# Patient Record
Sex: MALE | Race: WHITE | NOT HISPANIC OR LATINO | Employment: FULL TIME | URBAN - METROPOLITAN AREA
[De-identification: names, ages, dates, MRNs, and addresses within clinical notes are randomized per-mention and may not be internally consistent; named-entity substitution may affect disease eponyms.]

---

## 2017-06-15 ENCOUNTER — HOSPITAL ENCOUNTER (OUTPATIENT)
Dept: SLEEP MEDICINE | Facility: HOSPITAL | Age: 57
Discharge: HOME | End: 2017-06-15
Admitting: INTERNAL MEDICINE

## 2017-06-15 PROCEDURE — G0463 HOSPITAL OUTPT CLINIC VISIT: HCPCS

## 2017-06-15 PROCEDURE — 99214 OFFICE O/P EST MOD 30 MIN: CPT | Performed by: INTERNAL MEDICINE

## 2017-06-18 PROBLEM — Z99.89 OSA ON CPAP: Status: ACTIVE | Noted: 2017-06-18

## 2017-06-18 PROBLEM — G47.33 OSA ON CPAP: Status: ACTIVE | Noted: 2017-06-18

## 2017-06-18 NOTE — PROGRESS NOTES
[facility] Follow up Note    Reason for follow-up: JAI    Patient Care Team:  Marcos Silva MD as PCP - General (Emergency Medicine)  Minh Torres MD as Consulting Physician (Sleep Medicine)    Chief complaint:  JAI    History of present illness:  The patient is a 57 y.o. male who I last saw and 2014.  He has JAI treated with auto CPAP.  He works from 5 PM to 1 AM.  His normal sleep schedule is 3 AM to 10 AM.  He falls asleep within 5 minutes and he is sleepy upon arising.  He might take a nap once a week.  His [place] Sleepiness Scale is abnormal at 11.  He might awaken with a dry mouth.    Review of Systems:  Recorded on the Sleep Questionnaire.  Unremarkable except for frequent urination, nasal congestion and postnasal drip.    Past Medical History:  Hypertension and diabetes and 2 cardiac stents placed in 2002 2003.    Family History: Hypertension, heart disease, COPD, and his dad had pancreatic cancer.    Social History:  He is a [employer] .  He has never smoked cigarettes.  No alcohol and no caffeine.    Allergies:  None     Medication Review: His list was reviewed.  Metoprolol, triamterene, escitalopram, fish oil, vitamin B12, atorvastatin,Roperinole.    Vital Signs:  Height 69.5 inches and weight 248 and he is obese with a body mass index of 36.  In 2014 his weight was 258.  /64 and heart rate 60 and neck size 16.5 inches.     Physical Exam:  Recorded on the [facility] Physical Exam Form and is unremarkable except for:  A large tongue and normal uvula and narrow posterior pharyngeal opening and class II-III MP airway.     Results Review:  DME is [employer] and he uses a [employer].  Downloads between December 17, 2016-June 14, 2017 compliances 64% and average usage is 5 hours and 52 minutes and average AHI is normal with a leak of 57 minutes.  Average auto CPAP pressure is 14.5 and his auto CPAP is 13-20.       Impression:   Obstructive sleep apnea adequately  treated with auto titrating CPAP with suboptimal compliance and good usage.  He does have complaints of hypersomnolence however, he also has circadian rhythm sleep disorder, shiftwork type.    Plan:  Good sleep hygiene measures should be maintained weight loss would be beneficial.  The patient should uses auto CPAP every day.  If he takes his nap he should also uses auto CPAP.  I will see the patient back in 3-4 months to make sure that compliance has improved.    Thank you for allowing me to assist in this patient's care.    Minh Torres MD  06/18/17  12:46 PM

## 2018-04-09 ENCOUNTER — HOSPITAL ENCOUNTER (EMERGENCY)
Facility: HOSPITAL | Age: 58
Discharge: HOME | End: 2018-04-09
Attending: EMERGENCY MEDICINE | Admitting: EMERGENCY MEDICINE

## 2018-04-09 VITALS
TEMPERATURE: 97.7 F | SYSTOLIC BLOOD PRESSURE: 128 MMHG | BODY MASS INDEX: 36.07 KG/M2 | HEIGHT: 68 IN | RESPIRATION RATE: 16 BRPM | WEIGHT: 238 LBS | OXYGEN SATURATION: 96 % | HEART RATE: 56 BPM | DIASTOLIC BLOOD PRESSURE: 87 MMHG

## 2018-04-09 DIAGNOSIS — K59.00 CONSTIPATION, UNSPECIFIED CONSTIPATION TYPE: Primary | ICD-10-CM

## 2018-04-09 LAB
ALBUMIN SERPL-MCNC: 4.4 G/DL (ref 3.5–5.2)
ALBUMIN/GLOB SERPL: 1.5 G/DL
ALP SERPL-CCNC: 48 U/L (ref 39–117)
ALT SERPL W P-5'-P-CCNC: 24 U/L (ref 1–41)
ANION GAP SERPL CALCULATED.3IONS-SCNC: 12.7 MMOL/L
AST SERPL-CCNC: 23 U/L (ref 1–40)
BACTERIA UR QL AUTO: NORMAL /HPF
BASOPHILS # BLD AUTO: 0.02 10*3/MM3 (ref 0–0.2)
BASOPHILS NFR BLD AUTO: 0.3 % (ref 0–1.5)
BILIRUB SERPL-MCNC: 0.6 MG/DL (ref 0.1–1.2)
BILIRUB UR QL STRIP: NEGATIVE
BUN BLD-MCNC: 17 MG/DL (ref 6–20)
BUN/CREAT SERPL: 12.7 (ref 7–25)
CALCIUM SPEC-SCNC: 10 MG/DL (ref 8.6–10.5)
CHLORIDE SERPL-SCNC: 107 MMOL/L (ref 98–107)
CLARITY UR: CLEAR
CO2 SERPL-SCNC: 25.3 MMOL/L (ref 22–29)
COLOR UR: YELLOW
CREAT BLD-MCNC: 1.34 MG/DL (ref 0.76–1.27)
DEPRECATED RDW RBC AUTO: 44.6 FL (ref 37–54)
EOSINOPHIL # BLD AUTO: 0.17 10*3/MM3 (ref 0–0.7)
EOSINOPHIL NFR BLD AUTO: 2.5 % (ref 0.3–6.2)
ERYTHROCYTE [DISTWIDTH] IN BLOOD BY AUTOMATED COUNT: 13.2 % (ref 11.5–14.5)
GFR SERPL CREATININE-BSD FRML MDRD: 55 ML/MIN/1.73
GLOBULIN UR ELPH-MCNC: 2.9 GM/DL
GLUCOSE BLD-MCNC: 110 MG/DL (ref 65–99)
GLUCOSE UR STRIP-MCNC: NEGATIVE MG/DL
HCT VFR BLD AUTO: 43.9 % (ref 40.4–52.2)
HGB BLD-MCNC: 14.6 G/DL (ref 13.7–17.6)
HGB UR QL STRIP.AUTO: ABNORMAL
HOLD SPECIMEN: NORMAL
HOLD SPECIMEN: NORMAL
HYALINE CASTS UR QL AUTO: NORMAL /LPF
IMM GRANULOCYTES # BLD: 0 10*3/MM3 (ref 0–0.03)
IMM GRANULOCYTES NFR BLD: 0 % (ref 0–0.5)
KETONES UR QL STRIP: ABNORMAL
LEUKOCYTE ESTERASE UR QL STRIP.AUTO: NEGATIVE
LIPASE SERPL-CCNC: 34 U/L (ref 13–60)
LYMPHOCYTES # BLD AUTO: 1.55 10*3/MM3 (ref 0.9–4.8)
LYMPHOCYTES NFR BLD AUTO: 22.4 % (ref 19.6–45.3)
MCH RBC QN AUTO: 30.7 PG (ref 27–32.7)
MCHC RBC AUTO-ENTMCNC: 33.3 G/DL (ref 32.6–36.4)
MCV RBC AUTO: 92.4 FL (ref 79.8–96.2)
MONOCYTES # BLD AUTO: 0.73 10*3/MM3 (ref 0.2–1.2)
MONOCYTES NFR BLD AUTO: 10.6 % (ref 5–12)
NEUTROPHILS # BLD AUTO: 4.44 10*3/MM3 (ref 1.9–8.1)
NEUTROPHILS NFR BLD AUTO: 64.2 % (ref 42.7–76)
NITRITE UR QL STRIP: NEGATIVE
PH UR STRIP.AUTO: 5.5 [PH] (ref 5–8)
PLATELET # BLD AUTO: 204 10*3/MM3 (ref 140–500)
PMV BLD AUTO: 10.8 FL (ref 6–12)
POTASSIUM BLD-SCNC: 4.3 MMOL/L (ref 3.5–5.2)
PROT SERPL-MCNC: 7.3 G/DL (ref 6–8.5)
PROT UR QL STRIP: NEGATIVE
RBC # BLD AUTO: 4.75 10*6/MM3 (ref 4.6–6)
RBC # UR: NORMAL /HPF
REF LAB TEST METHOD: NORMAL
SODIUM BLD-SCNC: 145 MMOL/L (ref 136–145)
SP GR UR STRIP: 1.03 (ref 1–1.03)
SQUAMOUS #/AREA URNS HPF: NORMAL /HPF
UROBILINOGEN UR QL STRIP: ABNORMAL
WBC NRBC COR # BLD: 6.91 10*3/MM3 (ref 4.5–10.7)
WBC UR QL AUTO: NORMAL /HPF
WHOLE BLOOD HOLD SPECIMEN: NORMAL
WHOLE BLOOD HOLD SPECIMEN: NORMAL

## 2018-04-09 PROCEDURE — 81001 URINALYSIS AUTO W/SCOPE: CPT

## 2018-04-09 PROCEDURE — 83690 ASSAY OF LIPASE: CPT

## 2018-04-09 PROCEDURE — 85025 COMPLETE CBC W/AUTO DIFF WBC: CPT

## 2018-04-09 PROCEDURE — 80053 COMPREHEN METABOLIC PANEL: CPT

## 2018-04-09 PROCEDURE — 99283 EMERGENCY DEPT VISIT LOW MDM: CPT

## 2018-04-09 PROCEDURE — 36415 COLL VENOUS BLD VENIPUNCTURE: CPT

## 2018-04-09 RX ORDER — POLYETHYLENE GLYCOL 3350 17 G/17G
17 POWDER, FOR SOLUTION ORAL DAILY PRN
Qty: 500 G | Refills: 0 | Status: SHIPPED | OUTPATIENT
Start: 2018-04-09 | End: 2020-09-06

## 2018-04-09 RX ORDER — UBIDECARENONE 75 MG
50 CAPSULE ORAL DAILY
COMMUNITY
End: 2020-09-06

## 2018-04-09 RX ORDER — ESCITALOPRAM OXALATE 10 MG/1
10 TABLET ORAL DAILY
COMMUNITY
End: 2020-09-06

## 2018-04-09 RX ORDER — LISINOPRIL 10 MG/1
10 TABLET ORAL DAILY
COMMUNITY

## 2018-04-09 RX ORDER — ATORVASTATIN CALCIUM 10 MG/1
10 TABLET, FILM COATED ORAL DAILY
COMMUNITY
End: 2020-09-06

## 2018-04-09 RX ORDER — CYCLOBENZAPRINE HCL 10 MG
10 TABLET ORAL 3 TIMES DAILY PRN
COMMUNITY
End: 2020-09-06

## 2018-04-09 RX ORDER — ROPINIROLE 1 MG/1
1 TABLET, FILM COATED ORAL NIGHTLY
COMMUNITY

## 2018-04-09 RX ORDER — SODIUM CHLORIDE 0.9 % (FLUSH) 0.9 %
10 SYRINGE (ML) INJECTION AS NEEDED
Status: DISCONTINUED | OUTPATIENT
Start: 2018-04-09 | End: 2018-04-10 | Stop reason: HOSPADM

## 2018-04-09 RX ORDER — ASPIRIN 81 MG/1
81 TABLET, CHEWABLE ORAL DAILY
COMMUNITY

## 2018-04-09 RX ORDER — PRAMIPEXOLE DIHYDROCHLORIDE 0.25 MG/1
0.25 TABLET ORAL NIGHTLY
COMMUNITY
End: 2020-09-06

## 2018-04-09 NOTE — ED TRIAGE NOTES
"Pt states \"I haven't had a bowel movement in 4 days. I went an [facility] today and they did an x-ray and they told me they saw an obstruction and I should come to the ER.\" Pt denies N/V.  "

## 2018-04-12 ENCOUNTER — TELEPHONE (OUTPATIENT)
Dept: SOCIAL WORK | Facility: HOSPITAL | Age: 58
End: 2018-04-12

## 2020-09-06 ENCOUNTER — APPOINTMENT (OUTPATIENT)
Dept: GENERAL RADIOLOGY | Facility: HOSPITAL | Age: 60
End: 2020-09-06

## 2020-09-06 ENCOUNTER — HOSPITAL ENCOUNTER (OUTPATIENT)
Facility: HOSPITAL | Age: 60
Setting detail: OBSERVATION
Discharge: HOME | End: 2020-09-07
Attending: EMERGENCY MEDICINE | Admitting: HOSPITALIST

## 2020-09-06 ENCOUNTER — APPOINTMENT (OUTPATIENT)
Dept: CT IMAGING | Facility: HOSPITAL | Age: 60
End: 2020-09-06

## 2020-09-06 DIAGNOSIS — M48.02 CERVICAL SPINAL STENOSIS: Primary | ICD-10-CM

## 2020-09-06 DIAGNOSIS — R29.898 LEFT ARM WEAKNESS: ICD-10-CM

## 2020-09-06 DIAGNOSIS — R29.90 STROKE-LIKE SYMPTOM: ICD-10-CM

## 2020-09-06 DIAGNOSIS — M62.838 MUSCLE SPASM OF LEFT LOWER EXTREMITY: ICD-10-CM

## 2020-09-06 PROBLEM — Z20.822 SUSPECTED COVID-19 VIRUS INFECTION: Status: ACTIVE | Noted: 2020-09-06

## 2020-09-06 PROBLEM — R20.0 LEFT SIDED NUMBNESS: Status: ACTIVE | Noted: 2020-09-06

## 2020-09-06 PROBLEM — E11.9 DIABETES MELLITUS (HCC): Status: ACTIVE | Noted: 2020-09-06

## 2020-09-06 PROBLEM — I10 HYPERTENSION: Status: ACTIVE | Noted: 2020-09-06

## 2020-09-06 PROBLEM — N17.9 AKI (ACUTE KIDNEY INJURY): Status: ACTIVE | Noted: 2020-09-06

## 2020-09-06 PROBLEM — I25.10 CORONARY ARTERY DISEASE: Status: ACTIVE | Noted: 2020-09-06

## 2020-09-06 PROBLEM — E78.5 HYPERLIPIDEMIA: Status: ACTIVE | Noted: 2020-09-06

## 2020-09-06 PROBLEM — N18.9 CKD (CHRONIC KIDNEY DISEASE): Status: ACTIVE | Noted: 2020-09-06

## 2020-09-06 PROBLEM — G45.9 TIA (TRANSIENT ISCHEMIC ATTACK): Status: ACTIVE | Noted: 2020-09-06

## 2020-09-06 LAB
ALBUMIN SERPL-MCNC: 4.4 G/DL (ref 3.5–5.2)
ALBUMIN/GLOB SERPL: 1.8 G/DL
ALP SERPL-CCNC: 52 U/L (ref 39–117)
ALT SERPL W P-5'-P-CCNC: 18 U/L (ref 1–41)
ANION GAP SERPL CALCULATED.3IONS-SCNC: 9.5 MMOL/L (ref 5–15)
AST SERPL-CCNC: 18 U/L (ref 1–40)
BASOPHILS # BLD AUTO: 0.05 10*3/MM3 (ref 0–0.2)
BASOPHILS NFR BLD AUTO: 0.6 % (ref 0–1.5)
BILIRUB SERPL-MCNC: 0.7 MG/DL (ref 0–1.2)
BUN SERPL-MCNC: 17 MG/DL (ref 8–23)
BUN/CREAT SERPL: 10.5 (ref 7–25)
CALCIUM SPEC-SCNC: 9.2 MG/DL (ref 8.6–10.5)
CHLORIDE SERPL-SCNC: 108 MMOL/L (ref 98–107)
CO2 SERPL-SCNC: 22.5 MMOL/L (ref 22–29)
CREAT SERPL-MCNC: 1.62 MG/DL (ref 0.76–1.27)
DEPRECATED RDW RBC AUTO: 45 FL (ref 37–54)
EOSINOPHIL # BLD AUTO: 0.05 10*3/MM3 (ref 0–0.4)
EOSINOPHIL NFR BLD AUTO: 0.6 % (ref 0.3–6.2)
ERYTHROCYTE [DISTWIDTH] IN BLOOD BY AUTOMATED COUNT: 13.4 % (ref 12.3–15.4)
GFR SERPL CREATININE-BSD FRML MDRD: 44 ML/MIN/1.73
GLOBULIN UR ELPH-MCNC: 2.4 GM/DL
GLUCOSE SERPL-MCNC: 110 MG/DL (ref 65–99)
HCT VFR BLD AUTO: 41.9 % (ref 37.5–51)
HGB BLD-MCNC: 13.7 G/DL (ref 13–17.7)
HOLD SPECIMEN: NORMAL
HOLD SPECIMEN: NORMAL
IMM GRANULOCYTES # BLD AUTO: 0.02 10*3/MM3 (ref 0–0.05)
IMM GRANULOCYTES NFR BLD AUTO: 0.2 % (ref 0–0.5)
LYMPHOCYTES # BLD AUTO: 0.86 10*3/MM3 (ref 0.7–3.1)
LYMPHOCYTES NFR BLD AUTO: 10.3 % (ref 19.6–45.3)
MCH RBC QN AUTO: 29.8 PG (ref 26.6–33)
MCHC RBC AUTO-ENTMCNC: 32.7 G/DL (ref 31.5–35.7)
MCV RBC AUTO: 91.1 FL (ref 79–97)
MONOCYTES # BLD AUTO: 0.8 10*3/MM3 (ref 0.1–0.9)
MONOCYTES NFR BLD AUTO: 9.5 % (ref 5–12)
NEUTROPHILS NFR BLD AUTO: 6.61 10*3/MM3 (ref 1.7–7)
NEUTROPHILS NFR BLD AUTO: 78.8 % (ref 42.7–76)
NRBC BLD AUTO-RTO: 0 /100 WBC (ref 0–0.2)
PLATELET # BLD AUTO: 193 10*3/MM3 (ref 140–450)
PMV BLD AUTO: 9.8 FL (ref 6–12)
POTASSIUM SERPL-SCNC: 4.2 MMOL/L (ref 3.5–5.2)
PROT SERPL-MCNC: 6.8 G/DL (ref 6–8.5)
RBC # BLD AUTO: 4.6 10*6/MM3 (ref 4.14–5.8)
SODIUM SERPL-SCNC: 140 MMOL/L (ref 136–145)
TROPONIN T SERPL-MCNC: <0.01 NG/ML (ref 0–0.03)
WBC # BLD AUTO: 8.39 10*3/MM3 (ref 3.4–10.8)
WHOLE BLOOD HOLD SPECIMEN: NORMAL
WHOLE BLOOD HOLD SPECIMEN: NORMAL

## 2020-09-06 PROCEDURE — 80053 COMPREHEN METABOLIC PANEL: CPT | Performed by: NURSE PRACTITIONER

## 2020-09-06 PROCEDURE — U0004 COV-19 TEST NON-CDC HGH THRU: HCPCS | Performed by: NURSE PRACTITIONER

## 2020-09-06 PROCEDURE — G0378 HOSPITAL OBSERVATION PER HR: HCPCS

## 2020-09-06 PROCEDURE — 72125 CT NECK SPINE W/O DYE: CPT

## 2020-09-06 PROCEDURE — 96360 HYDRATION IV INFUSION INIT: CPT

## 2020-09-06 PROCEDURE — 93010 ELECTROCARDIOGRAM REPORT: CPT | Performed by: INTERNAL MEDICINE

## 2020-09-06 PROCEDURE — 99284 EMERGENCY DEPT VISIT MOD MDM: CPT

## 2020-09-06 PROCEDURE — C9803 HOPD COVID-19 SPEC COLLECT: HCPCS

## 2020-09-06 PROCEDURE — 96361 HYDRATE IV INFUSION ADD-ON: CPT

## 2020-09-06 PROCEDURE — 70450 CT HEAD/BRAIN W/O DYE: CPT

## 2020-09-06 PROCEDURE — 71045 X-RAY EXAM CHEST 1 VIEW: CPT

## 2020-09-06 PROCEDURE — 85025 COMPLETE CBC W/AUTO DIFF WBC: CPT | Performed by: NURSE PRACTITIONER

## 2020-09-06 PROCEDURE — 84484 ASSAY OF TROPONIN QUANT: CPT | Performed by: NURSE PRACTITIONER

## 2020-09-06 PROCEDURE — 93005 ELECTROCARDIOGRAM TRACING: CPT | Performed by: NURSE PRACTITIONER

## 2020-09-06 RX ORDER — CHOLECALCIFEROL (VITAMIN D3) 125 MCG
1000 CAPSULE ORAL DAILY
Status: DISCONTINUED | OUTPATIENT
Start: 2020-09-07 | End: 2020-09-07 | Stop reason: HOSPADM

## 2020-09-06 RX ORDER — SODIUM CHLORIDE 0.9 % (FLUSH) 0.9 %
10 SYRINGE (ML) INJECTION AS NEEDED
Status: DISCONTINUED | OUTPATIENT
Start: 2020-09-06 | End: 2020-09-07 | Stop reason: HOSPADM

## 2020-09-06 RX ORDER — GLIMEPIRIDE 4 MG/1
4 TABLET ORAL
COMMUNITY

## 2020-09-06 RX ORDER — DEXTROSE MONOHYDRATE 25 G/50ML
25 INJECTION, SOLUTION INTRAVENOUS
Status: DISCONTINUED | OUTPATIENT
Start: 2020-09-06 | End: 2020-09-07 | Stop reason: HOSPADM

## 2020-09-06 RX ORDER — MULTIVIT WITH MINERALS/LUTEIN
1000 TABLET ORAL DAILY
COMMUNITY

## 2020-09-06 RX ORDER — ROPINIROLE 1 MG/1
1 TABLET, FILM COATED ORAL NIGHTLY
Status: DISCONTINUED | OUTPATIENT
Start: 2020-09-06 | End: 2020-09-07 | Stop reason: HOSPADM

## 2020-09-06 RX ORDER — NICOTINE POLACRILEX 4 MG
15 LOZENGE BUCCAL
Status: DISCONTINUED | OUTPATIENT
Start: 2020-09-06 | End: 2020-09-07 | Stop reason: HOSPADM

## 2020-09-06 RX ORDER — SODIUM CHLORIDE 9 MG/ML
75 INJECTION, SOLUTION INTRAVENOUS CONTINUOUS
Status: DISCONTINUED | OUTPATIENT
Start: 2020-09-06 | End: 2020-09-07 | Stop reason: HOSPADM

## 2020-09-06 RX ORDER — ASCORBIC ACID 500 MG
1000 TABLET ORAL DAILY
Status: DISCONTINUED | OUTPATIENT
Start: 2020-09-07 | End: 2020-09-07 | Stop reason: HOSPADM

## 2020-09-06 RX ORDER — ASPIRIN 81 MG/1
324 TABLET, CHEWABLE ORAL ONCE
Status: COMPLETED | OUTPATIENT
Start: 2020-09-06 | End: 2020-09-06

## 2020-09-06 RX ORDER — ATORVASTATIN CALCIUM 20 MG/1
20 TABLET, FILM COATED ORAL DAILY
COMMUNITY

## 2020-09-06 RX ORDER — ASPIRIN 81 MG/1
81 TABLET, CHEWABLE ORAL DAILY
Status: DISCONTINUED | OUTPATIENT
Start: 2020-09-07 | End: 2020-09-07 | Stop reason: HOSPADM

## 2020-09-06 RX ORDER — ATORVASTATIN CALCIUM 20 MG/1
20 TABLET, FILM COATED ORAL DAILY
Status: DISCONTINUED | OUTPATIENT
Start: 2020-09-07 | End: 2020-09-07 | Stop reason: HOSPADM

## 2020-09-06 RX ORDER — OMEGA-3S/DHA/EPA/FISH OIL 1000-1400
1400 CAPSULE,DELAYED RELEASE (ENTERIC COATED) ORAL DAILY
COMMUNITY

## 2020-09-06 RX ORDER — ONDANSETRON 2 MG/ML
4 INJECTION INTRAMUSCULAR; INTRAVENOUS EVERY 6 HOURS PRN
Status: DISCONTINUED | OUTPATIENT
Start: 2020-09-06 | End: 2020-09-07 | Stop reason: HOSPADM

## 2020-09-06 RX ORDER — LANOLIN ALCOHOL/MO/W.PET/CERES
1000 CREAM (GRAM) TOPICAL DAILY
COMMUNITY

## 2020-09-06 RX ORDER — SODIUM CHLORIDE 0.9 % (FLUSH) 0.9 %
10 SYRINGE (ML) INJECTION EVERY 12 HOURS SCHEDULED
Status: DISCONTINUED | OUTPATIENT
Start: 2020-09-06 | End: 2020-09-07 | Stop reason: HOSPADM

## 2020-09-06 RX ADMIN — ASPIRIN 324 MG: 81 TABLET, CHEWABLE ORAL at 18:33

## 2020-09-06 RX ADMIN — SODIUM CHLORIDE 75 ML/HR: 9 INJECTION, SOLUTION INTRAVENOUS at 20:21

## 2020-09-06 NOTE — H&P
Internal medicine history and physical  INTERNAL MEDICINE   [facility]       Patient Identification:  Name: eBnny Rodríguez  Age: 60 y.o.  Sex: male  :  1960  MRN: [id number]                   Primary Care Physician: Marcos Silva MD                               Date of admission:2020    Chief Complaint: Sudden onset of left hand pain and decreased function with numbness involving the left side from face down to his leg this morning lasting for about couple hours.    History of Present Illness:   Patient is a 60-year-old male who has history of diabetes coronary artery disease and hypertension was in his usual state of his health until yesterday when he went to his primary care physician to get the shingles shot which was given to his left deltoid muscle.  Afterwards he was feeling fine went to bed without any problem except for some discomfort in his left arm which he thought expected.  He woke up this morning feeling fine and went to work.  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  According to him as he was driving he suddenly felt significant pain and discomfort in his left arm and hand along with poor control and weakness.  He had to use his right arm to maneuver his vehicle to turn and so forth.  This evolved into numbness and tingling of his left leg went up to his neck and involving the left side of his face.  Despite all of the stuff he was able to maneuver his vehicle at a stop he called his wife is something is not right and then he was brought to the emergency room for further evaluation.  All of these symptoms have significantly improved including resolution of numbness of his face and lower extremities but has residual discomfort at the injection site where he has shingles vaccine administered in the left shoulder.  He also had associated chills and dry cough since this morning which he was attributing to shingles vaccine.  Patient is currently feeling better.   Work-up in the emergency room did not show any acute intracranial process but CT scan of the spine did show significant degenerative changes at various levels with nerve impingement.  Patient was given aspirin and is being admitted for further care.  Patient denies any changes in his mentation, inability to understand and express himself or had any photophobia nausea or vomiting.      Past Medical History:  Past Medical History:   Diagnosis Date   • Coronary artery disease    • Diabetes mellitus (CMS/HCC)    • Hyperlipidemia    • Hypertension      Past Surgical History:  Past Surgical History:   Procedure Laterality Date   • CORONARY ANGIOPLASTY WITH STENT PLACEMENT        Home Meds:    (Not in a hospital admission)  Current Meds:     Current Facility-Administered Medications:   •  sodium chloride 0.9 % flush 10 mL, 10 mL, Intravenous, PRN, Emergency, Triage Protocol, MD  •  [COMPLETED] Insert peripheral IV, , , Once **AND** sodium chloride 0.9 % flush 10 mL, 10 mL, Intravenous, PRN, Tianna Rojas, JUMA    Current Outpatient Medications:   •  ascorbic acid (VITAMIN C) 1000 MG tablet, Take 1,000 mg by mouth Daily., Disp: , Rfl:   •  aspirin 81 MG chewable tablet, Chew 81 mg Daily., Disp: , Rfl:   •  atorvastatin (LIPITOR) 20 MG tablet, Take 20 mg by mouth Daily., Disp: , Rfl:   •  glimepiride (AMARYL) 4 MG tablet, Take 4 mg by mouth Every Morning Before Breakfast., Disp: , Rfl:   •  lisinopril (PRINIVIL,ZESTRIL) 10 MG tablet, Take 10 mg by mouth Daily., Disp: , Rfl:   •  Omega-3 Fatty Acids (FISH OIL ULTRA) 1400 MG capsule, Take 1,400 mg by mouth Daily., Disp: , Rfl:   •  rOPINIRole (REQUIP) 1 MG tablet, Take 1 mg by mouth Every Night. Take 1 hour before bedtime., Disp: , Rfl:   •  vitamin B-12 (CYANOCOBALAMIN) 1000 MCG tablet, Take 1,000 mcg by mouth Daily., Disp: , Rfl:   Allergies:  Allergies   Allergen Reactions   • Ampicillin Unknown (See Comments)     Pt does not know reaction     Social History:  "  Social History     Tobacco Use   • Smoking status: Never Smoker   • Smokeless tobacco: Never Used   Substance Use Topics   • Alcohol use: No      Family History:  History reviewed. No pertinent family history.       Review of Systems  See history of present illness and past medical history.    Constitutional: Remarkable for chills but no fever  Cardiovascular: Remarkable for no chest pain or shortness of breath  GI: Remarkable for no nausea vomiting or diarrhea  : Remarkable for no burning in urination frequency urgency  Musculoskeletal: Remarkable for left shoulder shingle vaccination injection site discomfort but no joint pain and deformities.  Neurological: Remarkable for what has been described in history presenting illness.      Vitals:   /96   Pulse 85   Temp 99.2 °F (37.3 °C) (Tympanic)   Resp 16   Ht 175.3 cm (69\")   Wt 116 kg (256 lb 8 oz)   SpO2 96%   BMI 37.88 kg/m²   I/O: No intake or output data in the 24 hours ending 09/06/20 1925  Exam:  Patient is examined using the personal protective equipment as per guidelines from infection control for this particular patient as enacted.  Hand washing was performed before and after patient interaction.  General Appearance:    Alert, cooperative, no distress, appears stated age   Head:    Normocephalic, without obvious abnormality, atraumatic   Eyes:    PERRL, conjunctiva/corneas clear, EOM's intact, both eyes   Ears:    Normal external ear canals, both ears   Nose:   Nares normal, septum midline, mucosa normal, no drainage    or sinus tenderness   Throat:   Lips, tongue, gums normal; oral mucosa pink and moist   Neck:   Supple, symmetrical, trachea midline, no adenopathy;     thyroid:  no enlargement/tenderness/nodules; no carotid    bruit or JVD   Back:     Symmetric, no curvature, ROM normal, no CVA tenderness   Lungs:     Clear to auscultation bilaterally, respirations unlabored   Chest Wall:    No tenderness or deformity    Heart:    Regular " rate and rhythm, S1 and S2 normal, no murmur, rub   or gallop   Abdomen:     Soft, non-tender, bowel sounds active all four quadrants,     no masses, no hepatomegaly, no splenomegaly   Extremities:  Left shoulder deltoid injection site with no erythema or warmth noted.   Pulses:   Pulses palpable in all extremities; symmetric all extremities   Skin:   Skin color normal, Skin is warm and dry,  no rashes or palpable lesions   Neurologic:   CNII-XII intact, motor strength grossly intact, sensation grossly intact to light touch, no focal deficits noted       Data Review:      I reviewed the patient's new clinical results.  Results from last 7 days   Lab Units 09/06/20  1600   WBC 10*3/mm3 8.39   HEMOGLOBIN g/dL 13.7   PLATELETS 10*3/mm3 193     Results from last 7 days   Lab Units 09/06/20  1600   SODIUM mmol/L 140   POTASSIUM mmol/L 4.2   CHLORIDE mmol/L 108*   CO2 mmol/L 22.5   BUN mg/dL 17   CREATININE mg/dL 1.62*   CALCIUM mg/dL 9.2   GLUCOSE mg/dL 110*     Microbiology Results (last 10 days)     ** No results found for the last 240 hours. **      Ct Head Without Contrast    Result Date: 9/6/2020  1. No evidence for acute intracranial abnormality. 2. Multilevel degenerative disc disease within the cervical spine. At C5-C6, there is a disc/osteophyte complex eccentric to the left with mild-to-moderate narrowing of the left aspect of the central canal. Uncovertebral overgrowth is present with moderate left neural foraminal narrowing. At C6-C7, there is a disc/osteophyte complex eccentric to the left and there appears to be mild narrowing of the left aspect of the central canal.  Discussed with JUMA Bergeron in the emergency department 09/06/2020 at 5:20 PM.  Radiation dose reduction techniques were utilized, including automated exposure control and exposure modulation based on body size.  This report was finalized on 9/6/2020 5:29 PM by Dr. Barrington Blue M.D.      Ct Cervical Spine Without Contrast    Result  Date: 9/6/2020  1. No evidence for acute intracranial abnormality. 2. Multilevel degenerative disc disease within the cervical spine. At C5-C6, there is a disc/osteophyte complex eccentric to the left with mild-to-moderate narrowing of the left aspect of the central canal. Uncovertebral overgrowth is present with moderate left neural foraminal narrowing. At C6-C7, there is a disc/osteophyte complex eccentric to the left and there appears to be mild narrowing of the left aspect of the central canal.  Discussed with JUMA Bergeron in the emergency department 09/06/2020 at 5:20 PM.  Radiation dose reduction techniques were utilized, including automated exposure control and exposure modulation based on body size.  This report was finalized on 9/6/2020 5:29 PM by Dr. Barrington Blue M.D.          Assessment:  Active Hospital Problems    Diagnosis POA   • **TIA (transient ischemic attack) [G45.9] Unknown   • Cervical spinal stenosis [M48.02] Yes   • Left sided numbness [R20.0] Unknown   • Suspected COVID-19 virus infection [Z20.828] Unknown   • Hypertension [I10] Unknown   • Hyperlipidemia [E78.5] Unknown   • Diabetes mellitus (CMS/HCC) [E11.9] Unknown   • Coronary artery disease [I25.10] Unknown   • CKD (chronic kidney disease) [N18.9] Unknown   • KENZIE (acute kidney injury) (CMS/HCC) [N17.9] Unknown   • JAI on autoCPAP [G47.33, Z99.89] Not Applicable       Medical decision making:  Sudden onset of left-sided numbness pain and weakness in the setting of recent shingle injection, documented C-spine degenerative changes and involvement of discomfort from face down to his lower extremities with resolution in the context of risk factors such as hypertension dyslipidemia and diabetes and obstructive sleep apnea-this presentation is concerning for TIA versus CVA with superimposed effect of degenerative spine disease and possible reaction to shingles vaccine.  Plan is to continue with aspirin and continue his statin along  with neurology consultation.  Get MRI of his brain and whether he need further imaging studies such as MRA of head and neck vessels in the setting of renal dysfunction that is noted would be deferred to neurology service based on their assessment of risk and benefit of MRI contrast study in the context of renal insufficiency.  Consult neurosurgery service for C-spine changes noted on the CT scan of the spine.  Cervical spinal stenosis-his symptoms seems to have resolved consult neurosurgery service.  Hypertension-continue antihypertensive regimen  Dyslipidemia-continue his current regimen and check fasting lipid profile.  Acute kidney injury on chronic kidney disease-monitor his renal function and avoid nephrotoxic agents and hypotensive episodes.  Diabetes mellitus-continue with Accu-Cheks and sliding scale coverage and monitor for hypoglycemia.  Obesity and obstructive sleep apnea-continue with his home CPAP program and allow him to use CPAP here if he knows his CPAP setting.    Ailyn Pop MD   9/6/2020  19:25  Much of this encounter note is an electronic transcription/translation of spoken language to printed text. The electronic translation of spoken language may permit erroneous, or at times, nonsensical words or phrases to be inadvertently transcribed; Although I have reviewed the note for such errors, some may still exist

## 2020-09-06 NOTE — PROGRESS NOTES
Clinical Pharmacy Services: Medication History    Benny Rodríguez is a 60 y.o. male presenting to [facility] for Cervical spinal stenosis [M48.02]    He  has a past medical history of Coronary artery disease, Diabetes mellitus (CMS/[facility]), Hyperlipidemia, and Hypertension.    Allergies as of 09/06/2020 - Reviewed 09/06/2020   Allergen Reaction Noted   • Ampicillin Unknown (See Comments) 04/09/2018       Medication information was obtained from: Patient, Patient supplied medication list  Pharmacy and Phone Number: [id number] - [place] [id number] [facility] - [phone] [facility] [phone] FX         Prior to Admission Medications     Prescriptions Last Dose Informant Patient Reported? Taking?    ascorbic acid (VITAMIN C) 1000 MG tablet 9/6/2020 Self Yes Yes    Take 1,000 mg by mouth Daily.    aspirin 81 MG chewable tablet 9/5/2020 Self Yes Yes    Chew 81 mg Daily.    atorvastatin (LIPITOR) 20 MG tablet 9/5/2020 Self Yes Yes    Take 20 mg by mouth Daily.    glimepiride (AMARYL) 4 MG tablet 9/6/2020 Self Yes Yes    Take 4 mg by mouth Every Morning Before Breakfast.    lisinopril (PRINIVIL,ZESTRIL) 10 MG tablet 9/6/2020 Self Yes Yes    Take 10 mg by mouth Daily.    Omega-3 Fatty Acids (FISH OIL ULTRA) 1400 MG capsule 9/6/2020 Self Yes Yes    Take 1,400 mg by mouth Daily.    rOPINIRole (REQUIP) 1 MG tablet 9/5/2020 Self Yes Yes    Take 1 mg by mouth Every Night. Take 1 hour before bedtime.    vitamin B-12 (CYANOCOBALAMIN) 1000 MCG tablet 9/6/2020 Self Yes Yes    Take 1,000 mcg by mouth Daily.            Medication notes:     This medication list is complete to the best of my knowledge as of 9/6/2020    Please call if questions.    Nirav Winter [facility]  9/6/2020 18:53

## 2020-09-06 NOTE — ED NOTES
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  Pt stated that it spread to his left leg.  Pt also c/o his L eye watering.  Pt stated that it now feels like the entire L side of his body feels like it's burning.      Pt in a surgical mask.    This nurse in gloves, goggles and surgical mask.      Elena Pan, RN  09/06/20 [id number]

## 2020-09-06 NOTE — ED PROVIDER NOTES
EMERGENCY DEPARTMENT ENCOUNTER    Room Number:  P590/1  Date of encounter:  9/6/2020  PCP: Marcos Silva MD  Historian: patient   Full history not obtainable due to: none     HPI:  Chief Complaint: Left hand pain     Context: Benny Rodríguez is a 60 y.o. male who presents to the ED c/o left hand pain onset 9am. Pain has been constant and was severe at onset. 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 Described as cramping pain radiating up into the shoulder, now more mild as it has eased off since onset. Also reports tingling in his LUE and weakness of the arm which has also been constant.   He has several other complaints noting he had chills this am and a dry cough. Also states his throat hurts when he breathes and he had left eye watering and left nare rhinorrhea today. Did get a shingles vaccination yesterday in the LUE and was concerned it was related.     DM, non insulin dependent. Not anticoagulated. Takes baby asp only.        PAST MEDICAL HISTORY    Active Ambulatory Problems     Diagnosis Date Noted   • JAI on autoCPAP 06/18/2017     Resolved Ambulatory Problems     Diagnosis Date Noted   • No Resolved Ambulatory Problems     Past Medical History:   Diagnosis Date   • Coronary artery disease    • Diabetes mellitus (CMS/[facility])    • Hyperlipidemia    • Hypertension          PAST SURGICAL HISTORY  Past Surgical History:   Procedure Laterality Date   • CORONARY ANGIOPLASTY WITH STENT PLACEMENT           FAMILY HISTORY  History reviewed. No pertinent family history.      SOCIAL HISTORY  Social History     Socioeconomic History   • Marital status:      Spouse name: Not on file   • Number of children: Not on file   • Years of education: Not on file   • Highest education level: Not on file   Tobacco Use   • Smoking status: Never Smoker   • Smokeless tobacco: Never Used   Substance and Sexual Activity   • Alcohol use: No   • Drug use: No   • Sexual activity: Defer          ALLERGIES  Ampicillin        REVIEW OF SYSTEMS  Review of Systems   All systems reviewed and marked as negative except as listed in HPI       PHYSICAL EXAM    I have reviewed the triage vital signs and nursing notes.    ED Triage Vitals   Temp Heart Rate Resp BP SpO2   09/06/20 1526 09/06/20 1526 09/06/20 1526 09/06/20 1538 09/06/20 1526   99.2 °F (37.3 °C) 98 18 136/85 98 %      Temp src Heart Rate Source Patient Position BP Location FiO2 (%)   09/06/20 1526 09/06/20 1526 09/06/20 1538 09/06/20 1538 --   Tympanic Monitor Lying Right arm        GENERAL: alert well developed, well nourished in no distress  HENT: NCAT, neck supple, trachea midline  EYES: no scleral icterus, PERRL, normal conjunctiva. No nystagmus   CV: regular rhythm, regular rate, no murmur  RESPIRATORY: unlabored effort, CTAB  ABDOMEN: soft, non-tender, non-distended, bowel sounds present  MUSCULOSKELETAL: no gross deformity. 4/5 LUE, 5/5 RUE. nml strength BLE  NEURO: alert,  sensory of extremities grossly intact, speech clear, mental status normal/baseline. Nml finger to nose. Symmetric smile.   SKIN: warm, dry, no rash  PSYCH:  Appropriate mood and affect    Vital signs and nursing notes reviewed.          LAB RESULTS  Recent Results (from the past 24 hour(s))   Light Blue Top    Collection Time: 09/06/20  4:00 PM   Result Value Ref Range    Extra Tube hold for add-on    Green Top (Gel)    Collection Time: 09/06/20  4:00 PM   Result Value Ref Range    Extra Tube Hold for add-ons.    Lavender Top    Collection Time: 09/06/20  4:00 PM   Result Value Ref Range    Extra Tube hold for add-on    Gold Top - SST    Collection Time: 09/06/20  4:00 PM   Result Value Ref Range    Extra Tube Hold for add-ons.    CBC Auto Differential    Collection Time: 09/06/20  4:00 PM   Result Value Ref Range    WBC 8.39 3.40 - 10.80 10*3/mm3    RBC 4.60 4.14 - 5.80 10*6/mm3    Hemoglobin 13.7 13.0 - 17.7 g/dL    Hematocrit 41.9 37.5 - 51.0 %    MCV 91.1 79.0 -  97.0 fL    MCH 29.8 26.6 - 33.0 pg    MCHC 32.7 31.5 - 35.7 g/dL    RDW 13.4 12.3 - 15.4 %    RDW-SD 45.0 37.0 - 54.0 fl    MPV 9.8 6.0 - 12.0 fL    Platelets 193 140 - 450 10*3/mm3    Neutrophil % 78.8 (H) 42.7 - 76.0 %    Lymphocyte % 10.3 (L) 19.6 - 45.3 %    Monocyte % 9.5 5.0 - 12.0 %    Eosinophil % 0.6 0.3 - 6.2 %    Basophil % 0.6 0.0 - 1.5 %    Immature Grans % 0.2 0.0 - 0.5 %    Neutrophils, Absolute 6.61 1.70 - 7.00 10*3/mm3    Lymphocytes, Absolute 0.86 0.70 - 3.10 10*3/mm3    Monocytes, Absolute 0.80 0.10 - 0.90 10*3/mm3    Eosinophils, Absolute 0.05 0.00 - 0.40 10*3/mm3    Basophils, Absolute 0.05 0.00 - 0.20 10*3/mm3    Immature Grans, Absolute 0.02 0.00 - 0.05 10*3/mm3    nRBC 0.0 0.0 - 0.2 /100 WBC   Troponin    Collection Time: 09/06/20  4:00 PM   Result Value Ref Range    Troponin T <0.010 0.000 - 0.030 ng/mL   Comprehensive Metabolic Panel    Collection Time: 09/06/20  4:00 PM   Result Value Ref Range    Glucose 110 (H) 65 - 99 mg/dL    BUN 17 8 - 23 mg/dL    Creatinine 1.62 (H) 0.76 - 1.27 mg/dL    Sodium 140 136 - 145 mmol/L    Potassium 4.2 3.5 - 5.2 mmol/L    Chloride 108 (H) 98 - 107 mmol/L    CO2 22.5 22.0 - 29.0 mmol/L    Calcium 9.2 8.6 - 10.5 mg/dL    Total Protein 6.8 6.0 - 8.5 g/dL    Albumin 4.40 3.50 - 5.20 g/dL    ALT (SGPT) 18 1 - 41 U/L    AST (SGOT) 18 1 - 40 U/L    Alkaline Phosphatase 52 39 - 117 U/L    Total Bilirubin 0.7 0.0 - 1.2 mg/dL    eGFR Non African Amer 44 (L) >60 mL/min/1.73    Globulin 2.4 gm/dL    A/G Ratio 1.8 g/dL    BUN/Creatinine Ratio 10.5 7.0 - 25.0    Anion Gap 9.5 5.0 - 15.0 mmol/L       Ordered the above labs and independently reviewed the results.        RADIOLOGY  Ct Head Without Contrast    Result Date: 9/6/2020  CT HEAD WITHOUT CONTRAST, CT CERVICAL SPINE WITHOUT CONTRAST  HISTORY: Left arm and hand tingling and weakness.  TECHNIQUE: CT includes axial imaging from the base of skull to the vertex without IV contrast. CT cervical spine: Axial imaging  from the skull base to the upper thoracic spine and data reconstructed in coronal and sagittal planes.  FINDINGS: HEAD: There are no abnormal areas of increased attenuation intraaxially suggest hemorrhage. No extra-axial fluid collection is observed. Is no evidence for cerebral edema or mass effect or shift of midline structures. Ventricles appear normal for size and configuration. There is mild bilateral maxillary sinus mucosal thickening.  CERVICAL SPINE: There is straightening of the cervical spine. Vertebral body heights are within normal limits. There are mild chronic degenerative changes of the anterior atlantoaxial articulation. No fracture or acute osseous abnormality is evident.  At C2-C3, central canal and neural foramina appear patent.  At C3-C4, central canal and neural foramina appear patent.  At C4-C5, there is disc space narrowing and there is a broad disc osteophyte complex eccentric to the left with mild narrowing of the left aspect of the central canal. Uncovertebral overgrowth is greater on the left with mild left neural foraminal narrowing.  At C5-C6, there is disc space narrowing. There is a disc osteophyte complex eccentric to the left, mild-to-moderate narrowing of the left aspect of the central canal. Uncovertebral overgrowth is present with moderate left neural foraminal narrowing. Right neural foramen is patent.  At C6-C7, there is disc osteophyte complex eccentric to the left with what appears to be mild narrowing of the left aspect of the central canal. Evaluation for canal narrowing is limited by the degree of streak related artifact. The neural foramina appear patent.  At C7-T1, central canal and neural foramina are patent.      1. No evidence for acute intracranial abnormality. 2. Multilevel degenerative disc disease within the cervical spine. At C5-C6, there is a disc/osteophyte complex eccentric to the left with mild-to-moderate narrowing of the left aspect of the central canal.  Uncovertebral overgrowth is present with moderate left neural foraminal narrowing. At C6-C7, there is a disc/osteophyte complex eccentric to the left and there appears to be mild narrowing of the left aspect of the central canal.  Discussed with JUMA Bergeron in the emergency department 09/06/2020 at 5:20 PM.  Radiation dose reduction techniques were utilized, including automated exposure control and exposure modulation based on body size.  This report was finalized on 9/6/2020 5:29 PM by Dr. Barrington Blue M.D.      Ct Cervical Spine Without Contrast    Result Date: 9/6/2020  CT HEAD WITHOUT CONTRAST, CT CERVICAL SPINE WITHOUT CONTRAST  HISTORY: Left arm and hand tingling and weakness.  TECHNIQUE: CT includes axial imaging from the base of skull to the vertex without IV contrast. CT cervical spine: Axial imaging from the skull base to the upper thoracic spine and data reconstructed in coronal and sagittal planes.  FINDINGS: HEAD: There are no abnormal areas of increased attenuation intraaxially suggest hemorrhage. No extra-axial fluid collection is observed. Is no evidence for cerebral edema or mass effect or shift of midline structures. Ventricles appear normal for size and configuration. There is mild bilateral maxillary sinus mucosal thickening.  CERVICAL SPINE: There is straightening of the cervical spine. Vertebral body heights are within normal limits. There are mild chronic degenerative changes of the anterior atlantoaxial articulation. No fracture or acute osseous abnormality is evident.  At C2-C3, central canal and neural foramina appear patent.  At C3-C4, central canal and neural foramina appear patent.  At C4-C5, there is disc space narrowing and there is a broad disc osteophyte complex eccentric to the left with mild narrowing of the left aspect of the central canal. Uncovertebral overgrowth is greater on the left with mild left neural foraminal narrowing.  At C5-C6, there is disc space  narrowing. There is a disc osteophyte complex eccentric to the left, mild-to-moderate narrowing of the left aspect of the central canal. Uncovertebral overgrowth is present with moderate left neural foraminal narrowing. Right neural foramen is patent.  At C6-C7, there is disc osteophyte complex eccentric to the left with what appears to be mild narrowing of the left aspect of the central canal. Evaluation for canal narrowing is limited by the degree of streak related artifact. The neural foramina appear patent.  At C7-T1, central canal and neural foramina are patent.      1. No evidence for acute intracranial abnormality. 2. Multilevel degenerative disc disease within the cervical spine. At C5-C6, there is a disc/osteophyte complex eccentric to the left with mild-to-moderate narrowing of the left aspect of the central canal. Uncovertebral overgrowth is present with moderate left neural foraminal narrowing. At C6-C7, there is a disc/osteophyte complex eccentric to the left and there appears to be mild narrowing of the left aspect of the central canal.  Discussed with JUMA Bergeron in the emergency department 09/06/2020 at 5:20 PM.  Radiation dose reduction techniques were utilized, including automated exposure control and exposure modulation based on body size.  This report was finalized on 9/6/2020 5:29 PM by Dr. Barrington Blue M.D.      Xr Chest 1 View    Result Date: 9/6/2020  ONE VIEW PORTABLE CHEST  HISTORY: Chest pain and left arm pain.  FINDINGS: The lungs are well-expanded and clear. The heart is borderline enlarged and there is no acute disease or change from 03/07/2015.  This report was finalized on 9/6/2020 4:55 PM by Dr. Luis Yoon M.D.        I ordered the above noted radiological studies. Independently reviewed by me and discussed with radiologist.  See dictation above for official radiology interpretation.      PROCEDURES    Procedures        MEDICATIONS GIVEN IN ER    Medications   sodium  chloride 0.9 % flush 10 mL (has no administration in time range)   sodium chloride 0.9 % flush 10 mL (has no administration in time range)   aspirin chewable tablet 81 mg (has no administration in time range)   rOPINIRole (REQUIP) tablet 1 mg (has no administration in time range)   atorvastatin (LIPITOR) tablet 20 mg (has no administration in time range)   vitamin B-12 (CYANOCOBALAMIN) tablet 1,000 mcg (has no administration in time range)   vitamin C (ASCORBIC ACID) tablet 1,000 mg (has no administration in time range)   dextrose (GLUTOSE) oral gel 15 g (has no administration in time range)   dextrose (D50W) 25 g/ 50mL Intravenous Solution 25 g (has no administration in time range)   glucagon (human recombinant) (GLUCAGEN DIAGNOSTIC) injection 1 mg (has no administration in time range)   sodium chloride 0.9 % flush 10 mL (has no administration in time range)   sodium chloride 0.9 % flush 10 mL (has no administration in time range)   sodium chloride 0.9 % infusion (has no administration in time range)   ondansetron (ZOFRAN) injection 4 mg (has no administration in time range)   insulin lispro (humaLOG) injection 0-9 Units (has no administration in time range)   aspirin chewable tablet 324 mg (324 mg Oral Given 9/6/20 1833)         PROGRESS, DATA ANALYSIS, CONSULTS, AND MEDICAL DECISION MAKING    All labs have been independently reviewed by me.  All radiology studies have been reviewed by me.   EKG's independently reviewed by me.  Discussion below represents my analysis of pertinent findings related to patient's condition, differential diagnosis, treatment plan and final disposition.    DIFFERENTIAL DIAGNOSIS INCLUDE BUT NOT LIMITED TO: CVA, TIA, RLS, cervical radiculopathy, seizure, pseudo seizure, arrhythmia, sepsis, rigors, tremor, electrolyte disturbance,  substance abuse.       ED Course as of Sep 06 2018   Sun Sep 06, 2020   5004 I discussed patient with Dr. Blue, radiologist regarding CT of the head and  cervical spine.  He reports the patient has moderate canal stenosis greatest at C5 and C6 particularly on the left, and also at level C6-C7.  CT of the head is negative for acute findings.    [JS]   1732 Creatinine(!): 1.62 [JS]   1732 Troponin T: <0.010 [JS]   1732 WBC: 8.39 [JS]   1732 Hemoglobin: 13.7 [JS]   1805 NIHSS 0. Mild left  strength decrease at 4/5 is noted LUE. Discussed pt with Dr Nagy, neurology. He does not feel that we have to CTA the pt. He is agreeable to consult with plan for admission and MRI during inpatient stay.    [JS]   1846 I discussed the patient with Dr. Fields, [facility], who agrees admit the patient to the hospital for further evaluation of strokelike symptoms.    [JS]      ED Course User Index  [JS] Tianna Rojas APRN     MDM: The patient presents with left upper extremity pain and weakness with left leg spasm and weakness onset at 9 AM this morning.  He does have a degree of cervical spinal stenosis which may be causing radicular symptoms in his left upper extremity however I cannot explain his left lower leg weakness and spasm.  Given age 60 and concern for possible TIA, will admit the patient to the hospital for further evaluation of possible stroke symptoms.  Neurology was consulted prior to admission.  He is not a candidate for TPA as his NIH stroke scale is 0 and symptoms are improving without intervention.  The patient is agreeable with the plan.  The patient is agreeable to admission at this time.  AS OF 20:18 VITALS:        BP - 147/69  HR - 77  TEMP - 99.2 °F (37.3 °C) (Tympanic)  02 SATS - 94%          DIAGNOSIS  Final diagnoses:   Cervical spinal stenosis   Left arm weakness   Muscle spasm of left lower extremity   Stroke-like symptom         DISPOSITION  Admission     Pt masked in first look. I wore a surgical mask and protective eye wear throughout my encounters with the pt. I performed hand hygiene on entry into the pt room and upon exit.     Dictated  utilizing Dragon dictation:  Much of this encounter note is an electronic transcription/translation of spoken language to printed text. The electronic translation of spoken language may permit erroneous, or at times, nonsensical words or phrases to be inadvertently transcribed; Although I have reviewed the note for such errors, some may still exist.       Tianna Rojas APRN  09/06/20 2019

## 2020-09-06 NOTE — ED TRIAGE NOTES
Pt c/o L sided pain, weakness, and swelling that began when he was driving to work this morning at 0800. Reports having shingles shot yesterday in L arm. Pt able to ambulate without noted difficulty. No other neuro deficits noted. Pt and visitor wearing masks. This RN wearing mask and safety glasses.

## 2020-09-06 NOTE — ED PROVIDER NOTES
Pt presents to the ED c/o  left hand and arm pain and swelling with some tingling and weakness of the left arm and hand.  Patient received a shingles vaccine yesterday, symptoms started today.  Reported some left side and left lower extremity tingling as well as some cramping in the left calf.  Symptoms have mostly resolved except for some residual mild weakness in the left hand.  Patient is a [employer] .      On exam,   General: Awake, alert, no acute distress  HEENT: Mucous membranes moist, atraumatic, normocephalic, EOMI  Neck: Full ROM  Pulm: Symmetric, nonlabored, lungs CTAB  Cardiovascular: Regular rate and rhythm, normal S1/S2, intact distal pulses  GI: Soft, nontender, nondistended, no rebound, no guarding, bowel sounds present  MSK: Full ROM, slightly decreased 4-5 strength in the distal left upper extremity as compared to normal strength in the right upper extremity.    Skin: Warm, dry  Neuro: Alert and oriented x 3, GCS 15, cranial nerves II through XII intact, no dysarthria or aphasia, no facial droop, no ataxia, moving all extremities  Psych: Calm, cooperative      Surgical mask, protective eye goggles, and gloves used during this encounter. Patient in surgical mask.      Plan:   ED Course as of Sep 06 2217   Sun Sep 06, 2020   1719 I discussed patient with Dr. Blue, radiologist regarding CT of the head and cervical spine.  He reports the patient has moderate canal stenosis greatest at C5 and C6 particularly on the left, and also at level C6-C7.  CT of the head is negative for acute findings.    [JS]   1732 Creatinine(!): 1.62 [JS]   1732 Troponin T: <0.010 [JS]   1732 WBC: 8.39 [JS]   1732 Hemoglobin: 13.7 [JS]   1805 NIHSS 0. Mild left  strength decrease at 4/5 is noted LUE. Discussed pt with Dr Nagy, neurology. He does not feel that we have to CTA the pt. He is agreeable to consult with plan for admission and MRI during inpatient stay.    [JS]   1846 I discussed the patient with   PAGE Fields, who agrees admit the patient to the hospital for further evaluation of strokelike symptoms.    [JS]   1900 EKG interpreted by me.  Time 1621.  Rate 84, normal sinus rhythm, normal intervals.  Normal axis.  No ST elevation is noted.  No prior available for comparison.    [JS]      ED Course User Index  [JS] Tianna Rojas APRN     Plan for hospitalization for remainder of stroke work-up as well as potentially a cervical MRI to evaluate the stenosis noted on CT scan.  This may be a multifactorial issue as a secondary effect of the shingles vaccine, cervical stenosis, potential TIA versus CVA.  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     Attestation:  The [facility] and I have discussed this patient's history, physical exam, and treatment plan.  I have reviewed the documentation and personally had a face to face interaction with the patient. I affirm the documentation and agree with the treatment and plan.  The attached note describes my personal findings.          Milind Terry MD  09/06/20 [id number]

## 2020-09-07 ENCOUNTER — APPOINTMENT (OUTPATIENT)
Dept: MRI IMAGING | Facility: HOSPITAL | Age: 60
End: 2020-09-07

## 2020-09-07 ENCOUNTER — APPOINTMENT (OUTPATIENT)
Dept: CARDIOLOGY | Facility: HOSPITAL | Age: 60
End: 2020-09-07

## 2020-09-07 VITALS
HEART RATE: 63 BPM | HEIGHT: 69 IN | WEIGHT: 248.68 LBS | RESPIRATION RATE: 16 BRPM | BODY MASS INDEX: 36.83 KG/M2 | TEMPERATURE: 97.3 F | DIASTOLIC BLOOD PRESSURE: 74 MMHG | OXYGEN SATURATION: 95 % | SYSTOLIC BLOOD PRESSURE: 132 MMHG

## 2020-09-07 PROBLEM — Z20.822 SUSPECTED COVID-19 VIRUS INFECTION: Status: RESOLVED | Noted: 2020-09-06 | Resolved: 2020-09-07

## 2020-09-07 PROBLEM — G45.9 TIA (TRANSIENT ISCHEMIC ATTACK): Status: RESOLVED | Noted: 2020-09-06 | Resolved: 2020-09-07

## 2020-09-07 PROBLEM — G43.909 HEADACHE, MIGRAINE: Status: ACTIVE | Noted: 2020-09-07

## 2020-09-07 LAB
ALBUMIN SERPL-MCNC: 4.3 G/DL (ref 3.5–5.2)
ALBUMIN/GLOB SERPL: 1.9 G/DL
ALP SERPL-CCNC: 53 U/L (ref 39–117)
ALT SERPL W P-5'-P-CCNC: 16 U/L (ref 1–41)
ANION GAP SERPL CALCULATED.3IONS-SCNC: 10.1 MMOL/L (ref 5–15)
AST SERPL-CCNC: 18 U/L (ref 1–40)
BH CV XLRA MEAS LEFT DIST CCA EDV: -23.4 CM/SEC
BH CV XLRA MEAS LEFT DIST CCA PSV: -88.5 CM/SEC
BH CV XLRA MEAS LEFT DIST ICA EDV: -21.2 CM/SEC
BH CV XLRA MEAS LEFT DIST ICA PSV: -75 CM/SEC
BH CV XLRA MEAS LEFT MID ICA EDV: -25.5 CM/SEC
BH CV XLRA MEAS LEFT MID ICA PSV: -74.3 CM/SEC
BH CV XLRA MEAS LEFT PROX CCA EDV: 16.3 CM/SEC
BH CV XLRA MEAS LEFT PROX CCA PSV: 77 CM/SEC
BH CV XLRA MEAS LEFT PROX ECA EDV: -17 CM/SEC
BH CV XLRA MEAS LEFT PROX ECA PSV: -87 CM/SEC
BH CV XLRA MEAS LEFT PROX ICA EDV: -19.8 CM/SEC
BH CV XLRA MEAS LEFT PROX ICA PSV: -74.3 CM/SEC
BH CV XLRA MEAS LEFT PROX SCLA PSV: 141 CM/SEC
BH CV XLRA MEAS LEFT VERTEBRAL A EDV: -11.2 CM/SEC
BH CV XLRA MEAS LEFT VERTEBRAL A PSV: -36.5 CM/SEC
BH CV XLRA MEAS RIGHT DIST CCA EDV: -27.6 CM/SEC
BH CV XLRA MEAS RIGHT DIST CCA PSV: -99.8 CM/SEC
BH CV XLRA MEAS RIGHT DIST ICA EDV: -27.6 CM/SEC
BH CV XLRA MEAS RIGHT DIST ICA PSV: -79.3 CM/SEC
BH CV XLRA MEAS RIGHT MID ICA EDV: -23.4 CM/SEC
BH CV XLRA MEAS RIGHT MID ICA PSV: -85.6 CM/SEC
BH CV XLRA MEAS RIGHT PROX CCA EDV: 22.6 CM/SEC
BH CV XLRA MEAS RIGHT PROX CCA PSV: 117 CM/SEC
BH CV XLRA MEAS RIGHT PROX ECA EDV: -12.7 CM/SEC
BH CV XLRA MEAS RIGHT PROX ECA PSV: -89.2 CM/SEC
BH CV XLRA MEAS RIGHT PROX ICA EDV: -17 CM/SEC
BH CV XLRA MEAS RIGHT PROX ICA PSV: -93.4 CM/SEC
BH CV XLRA MEAS RIGHT PROX SCLA PSV: 198 CM/SEC
BH CV XLRA MEAS RIGHT VERTEBRAL A EDV: -15.6 CM/SEC
BH CV XLRA MEAS RIGHT VERTEBRAL A PSV: -45.5 CM/SEC
BILIRUB SERPL-MCNC: 0.7 MG/DL (ref 0–1.2)
BUN SERPL-MCNC: 14 MG/DL (ref 8–23)
BUN/CREAT SERPL: 9.5 (ref 7–25)
CALCIUM SPEC-SCNC: 8.7 MG/DL (ref 8.6–10.5)
CHLORIDE SERPL-SCNC: 108 MMOL/L (ref 98–107)
CHOLEST SERPL-MCNC: 105 MG/DL (ref 0–200)
CO2 SERPL-SCNC: 23.9 MMOL/L (ref 22–29)
CREAT SERPL-MCNC: 1.48 MG/DL (ref 0.76–1.27)
DEPRECATED RDW RBC AUTO: 46.7 FL (ref 37–54)
ERYTHROCYTE [DISTWIDTH] IN BLOOD BY AUTOMATED COUNT: 13.6 % (ref 12.3–15.4)
GFR SERPL CREATININE-BSD FRML MDRD: 48 ML/MIN/1.73
GLOBULIN UR ELPH-MCNC: 2.3 GM/DL
GLUCOSE BLDC GLUCOMTR-MCNC: 103 MG/DL (ref 70–130)
GLUCOSE BLDC GLUCOMTR-MCNC: 151 MG/DL (ref 70–130)
GLUCOSE SERPL-MCNC: 105 MG/DL (ref 65–99)
HBA1C MFR BLD: 6.5 % (ref 4.8–5.6)
HCT VFR BLD AUTO: 44.3 % (ref 37.5–51)
HDLC SERPL-MCNC: 37 MG/DL (ref 40–60)
HGB BLD-MCNC: 14.4 G/DL (ref 13–17.7)
LDLC SERPL CALC-MCNC: 46 MG/DL (ref 0–100)
LDLC/HDLC SERPL: 1.25 {RATIO}
LEFT ARM BP: NORMAL MMHG
MCH RBC QN AUTO: 30.2 PG (ref 26.6–33)
MCHC RBC AUTO-ENTMCNC: 32.5 G/DL (ref 31.5–35.7)
MCV RBC AUTO: 92.9 FL (ref 79–97)
PLATELET # BLD AUTO: 169 10*3/MM3 (ref 140–450)
PMV BLD AUTO: 9.6 FL (ref 6–12)
POTASSIUM SERPL-SCNC: 4.1 MMOL/L (ref 3.5–5.2)
PROT SERPL-MCNC: 6.6 G/DL (ref 6–8.5)
RBC # BLD AUTO: 4.77 10*6/MM3 (ref 4.14–5.8)
RIGHT ARM BP: NORMAL MMHG
SARS-COV-2 RNA RESP QL NAA+PROBE: NOT DETECTED
SODIUM SERPL-SCNC: 142 MMOL/L (ref 136–145)
TRIGL SERPL-MCNC: 109 MG/DL (ref 0–150)
VLDLC SERPL-MCNC: 21.8 MG/DL (ref 5–40)
WBC # BLD AUTO: 6.45 10*3/MM3 (ref 3.4–10.8)

## 2020-09-07 PROCEDURE — 99204 OFFICE O/P NEW MOD 45 MIN: CPT | Performed by: PSYCHIATRY & NEUROLOGY

## 2020-09-07 PROCEDURE — 80053 COMPREHEN METABOLIC PANEL: CPT | Performed by: INTERNAL MEDICINE

## 2020-09-07 PROCEDURE — 83036 HEMOGLOBIN GLYCOSYLATED A1C: CPT | Performed by: INTERNAL MEDICINE

## 2020-09-07 PROCEDURE — 97166 OT EVAL MOD COMPLEX 45 MIN: CPT

## 2020-09-07 PROCEDURE — 63710000001 INSULIN LISPRO (HUMAN) PER 5 UNITS: Performed by: INTERNAL MEDICINE

## 2020-09-07 PROCEDURE — 97161 PT EVAL LOW COMPLEX 20 MIN: CPT

## 2020-09-07 PROCEDURE — G0378 HOSPITAL OBSERVATION PER HR: HCPCS

## 2020-09-07 PROCEDURE — 70551 MRI BRAIN STEM W/O DYE: CPT

## 2020-09-07 PROCEDURE — 80061 LIPID PANEL: CPT | Performed by: INTERNAL MEDICINE

## 2020-09-07 PROCEDURE — 96361 HYDRATE IV INFUSION ADD-ON: CPT

## 2020-09-07 PROCEDURE — 85027 COMPLETE CBC AUTOMATED: CPT | Performed by: INTERNAL MEDICINE

## 2020-09-07 PROCEDURE — 82962 GLUCOSE BLOOD TEST: CPT

## 2020-09-07 PROCEDURE — 93880 EXTRACRANIAL BILAT STUDY: CPT

## 2020-09-07 PROCEDURE — 97530 THERAPEUTIC ACTIVITIES: CPT

## 2020-09-07 RX ADMIN — INSULIN LISPRO 2 UNITS: 100 INJECTION, SOLUTION INTRAVENOUS; SUBCUTANEOUS at 12:28

## 2020-09-07 RX ADMIN — SODIUM CHLORIDE, PRESERVATIVE FREE 10 ML: 5 INJECTION INTRAVENOUS at 08:56

## 2020-09-07 RX ADMIN — SODIUM CHLORIDE, PRESERVATIVE FREE 10 ML: 5 INJECTION INTRAVENOUS at 00:19

## 2020-09-07 RX ADMIN — ROPINIROLE 1 MG: 1 TABLET, FILM COATED ORAL at 00:17

## 2020-09-07 RX ADMIN — SODIUM CHLORIDE 75 ML/HR: 9 INJECTION, SOLUTION INTRAVENOUS at 12:36

## 2020-09-07 RX ADMIN — ATORVASTATIN CALCIUM 20 MG: 20 TABLET, FILM COATED ORAL at 08:56

## 2020-09-07 RX ADMIN — Medication 1000 MCG: at 08:56

## 2020-09-07 RX ADMIN — ASPIRIN 81 MG: 81 TABLET, CHEWABLE ORAL at 08:56

## 2020-09-07 RX ADMIN — OXYCODONE HYDROCHLORIDE AND ACETAMINOPHEN 1000 MG: 500 TABLET ORAL at 08:56

## 2020-09-07 NOTE — SIGNIFICANT NOTE
09/07/20 1047   Rehab Time/Intention   Evaluation Not Performed other (see comments)  (Orders recived, chart reviewed.  CT negative.  Passed swallow screen and is tolerating diet w/o difficulties per RN.  Pt denies changes to speech or language.  Will s/o.  If problems noted, please reconsult.)   Rehab Treatment   Discipline speech language pathologist

## 2020-09-07 NOTE — PLAN OF CARE
Problem: Patient Care Overview  Goal: Plan of Care Review  Flowsheets (Taken 9/7/2020 0768)  Outcome Summary: Pt is a 60 y.o male admitted to [facility] after stroke like symptoms affecting the L side. Pt reports all symptoms have resolved at time of OT eval. No deficits noted in LUE strength, coordination, or sensation. Pt is eating breakfast with LUE demo'ing good functional use of LUE. No acute needs for OT at current time.   Note:   Appropriate PPE worn throughout encounter including gloves, safety glasses, and mask. Hand hygiene performed prior and after. Pt was not in a mask.

## 2020-09-07 NOTE — CONSULTS
Patient Identification:  NAME:  Benny Rodríguez  Age:  60 y.o.   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  This lasted for more than a few minutes no facial symptoms at all this slowly got better but it seemed to slowly go down his left side and into the left leg by the time he went to lunch his arm was working better but when he walked out of the lunch he noted his left leg felt a little weak.  He also noted that the left side had the associated symptoms of his hair standing up on his arm and his leg not present on the right side.  He did develop a mild dull headache subsequent to this.  The symptoms were on the left side duration of couple of hours followed by resolution followed by headache modifying factors none today he is absolutely back to normal and feels fine.  Quality as described he says that the arm felt painful not really numb but did have weakness as well similar to the leg and had the hair standing up on both at one point.  Duration is noted and associated symptoms as noted      Past medical history:  Past Medical History:   Diagnosis Date   • Coronary artery disease    • Diabetes mellitus (CMS/[facility])    • Hyperlipidemia    • Hypertension        Past surgical history:  Past Surgical History:   Procedure Laterality Date   • CORONARY ANGIOPLASTY WITH STENT PLACEMENT         Allergies:  Ampicillin    Home medications:  Medications Prior to Admission   Medication Sig Dispense Refill Last Dose   • ascorbic acid  (VITAMIN C) 1000 MG tablet Take 1,000 mg by mouth Daily.   9/6/2020 at Unknown time   • aspirin 81 MG chewable tablet Chew 81 mg Daily.   9/5/2020 at Unknown time   • atorvastatin (LIPITOR) 20 MG tablet Take 20 mg by mouth Daily.   9/5/2020 at Unknown time   • glimepiride (AMARYL) 4 MG tablet Take 4 mg by mouth Every Morning Before Breakfast.   9/6/2020 at Unknown time   • lisinopril (PRINIVIL,ZESTRIL) 10 MG tablet Take 10 mg by mouth Daily.   9/6/2020 at Unknown time   • Omega-3 Fatty Acids (FISH OIL ULTRA) 1400 MG capsule Take 1,400 mg by mouth Daily.   9/6/2020 at Unknown time   • rOPINIRole (REQUIP) 1 MG tablet Take 1 mg by mouth Every Night. Take 1 hour before bedtime.   9/5/2020 at Unknown time   • vitamin B-12 (CYANOCOBALAMIN) 1000 MCG tablet Take 1,000 mcg by mouth Daily.   9/6/2020 at Unknown time        Hospital medications:    aspirin 81 mg Oral Daily   atorvastatin 20 mg Oral Daily   insulin lispro 0-9 Units Subcutaneous TID AC   rOPINIRole 1 mg Oral Nightly   sodium chloride 10 mL Intravenous Q12H   vitamin B-12 1,000 mcg Oral Daily   ascorbic acid 1,000 mg Oral Daily       sodium chloride 75 mL/hr Last Rate: 75 mL/hr (09/07/20 1022)     dextrose  •  dextrose  •  glucagon (human recombinant)  •  ondansetron  •  sodium chloride  •  [COMPLETED] Insert peripheral IV **AND** sodium chloride  •  sodium chloride    Family history:  History reviewed. No pertinent family history.    Social history:  Social History     Tobacco Use   • Smoking status: Never Smoker   • Smokeless tobacco: Never Used   Substance Use Topics   • Alcohol use: No   • Drug use: No       Review of systems:    He has had significant headaches in his life sometimes preceded by visual phenomena that seem to move across his visual field otherwise no head eyes ears nose throat skin bone joint  lymphatic hematologic oncologic complaints no chest pain abdominal pain bowel bladder incontinence fever chills or rash    Objective:  Vitals Ranges:    Temp:  [96.9 °F (36.1 °C)-99.2 °F (37.3 °C)] 97.8 °F (36.6 °C)  Heart Rate:  [58-98] 64  Resp:  [16-18] 16  BP: (106-159)/(61-96) 132/89      Physical Exam:  He is awake alert oriented x3 in no distress fund of knowledge attention span concentration recent remote memory language is all normal well-developed well-nourished in no distress pupils 2 constricting to 1-1/2 unable to visualize disc and retinas visual fields are full extraocular movements full without nystagmus nasolabial folds palate tongue symmetrical normal hearing facial sensation head turning shoulder shrug motor 5 out of 5 x 4 extremities no atrophy fasciculations rigidity or bradykinesia reflexes trace throughout symmetrical toes downgoing bilaterally sensation normal light touch face both arms and both legs coordination normal in the upper extremities he is able to ambulate without ataxia heart is regular without murmur neck supple without bruits extremities no clubbing cyanosis edema visual acuity normal at 3 feet    Results review:   I reviewed the patient's new clinical results.    Data review:  Lab Results (last 24 hours)     Procedure Component Value Units Date/Time    POC Glucose Once [[id number]]  (Abnormal) Collected:  09/07/20 1144    Specimen:  Blood Updated:  09/07/20 1156     Glucose 151 mg/dL     Comprehensive Metabolic Panel [[id number]]  (Abnormal) Collected:  09/07/20 0602    Specimen:  Blood Updated:  09/07/20 0657     Glucose 105 mg/dL      BUN 14 mg/dL      Creatinine 1.48 mg/dL      Sodium 142 mmol/L      Potassium 4.1 mmol/L      Chloride 108 mmol/L      CO2 23.9 mmol/L      Calcium 8.7 mg/dL      Total Protein 6.6 g/dL      Albumin 4.30 g/dL      ALT (SGPT) 16 U/L      AST (SGOT) 18 U/L      Alkaline Phosphatase 53 U/L      Total Bilirubin 0.7 mg/dL      eGFR Non African Amer 48 mL/min/1.73      Globulin 2.3 gm/dL      A/G Ratio 1.9 g/dL      BUN/Creatinine Ratio 9.5     Anion Gap 10.1 mmol/L     Narrative:       GFR Normal  >60  Chronic Kidney Disease <60  Kidney Failure <15      Lipid Panel [[id number]]  (Abnormal) Collected:  09/07/20 0602    Specimen:  Blood Updated:  09/07/20 0657     Total Cholesterol 105 mg/dL      Triglycerides 109 mg/dL      HDL Cholesterol 37 mg/dL      LDL Cholesterol  46 mg/dL      VLDL Cholesterol 21.8 mg/dL      LDL/HDL Ratio 1.25    Narrative:       Cholesterol Reference Ranges  (U.S. Department of Health and Human Services ATP III Classifications)    Desirable          <200 mg/dL  Borderline High    200-239 mg/dL  High Risk          >240 mg/dL      Triglyceride Reference Ranges  (U.S. Department of Health and Human Services ATP III Classifications)    Normal           <150 mg/dL  Borderline High  150-199 mg/dL  High             200-499 mg/dL  Very High        >500 mg/dL    HDL Reference Ranges  (U.S. Department of Health and Human Services ATP III Classifcations)    Low     <40 mg/dl (major risk factor for CHD)  High    >60 mg/dl ('negative' risk factor for CHD)        LDL Reference Ranges  (U.S. Department of Health and Human Services ATP III Classifcations)    Optimal          <100 mg/dL  Near Optimal     100-129 mg/dL  Borderline High  130-159 mg/dL  High             160-189 mg/dL  Very High        >189 mg/dL    Hemoglobin A1c [[id number]]  (Abnormal) Collected:  09/07/20 0602    Specimen:  Blood Updated:  09/07/20 0646     Hemoglobin A1C 6.50 %     Narrative:       Hemoglobin A1C Ranges:    Increased Risk for Diabetes  5.7% to 6.4%  Diabetes                     >= 6.5%  Diabetic Goal                < 7.0%    CBC (No Diff) [[id number]]  (Normal) Collected:  09/07/20 0602    Specimen:  Blood Updated:  09/07/20 0633     WBC 6.45 10*3/mm3      RBC 4.77 10*6/mm3      Hemoglobin 14.4 g/dL      Hematocrit 44.3 %      MCV 92.9 fL      MCH 30.2 pg      MCHC 32.5 g/dL      RDW 13.6 %      RDW-SD 46.7 fl      MPV 9.6 fL      Platelets 169 10*3/mm3     POC Glucose Once [[id number]]  (Normal) Collected:  09/07/20  0611    Specimen:  Blood Updated:  09/07/20 0613     Glucose 103 mg/dL     COVID PRE-OP / PRE-PROCEDURE SCREENING ORDER (NO ISOLATION) - Swab, Nasopharynx [[id number]] Collected:  09/06/20 1915    Specimen:  Swab from Nasopharynx Updated:  09/06/20 1921    Narrative:       The following orders were created for panel order COVID PRE-OP / PRE-PROCEDURE SCREENING ORDER (NO ISOLATION) - Swab, Nasopharynx.  Procedure                               Abnormality         Status                     ---------                               -----------         ------                     COVID-19,BIOTAP, NP/OP S...[[id number]]                      In process                   Please view results for these tests on the individual orders.    COVID-19,BIOTAP, NP/OP SWAB IN TRANSPORT MEDIA OR SALINE 24-36 HR TAT - Swab, Nasopharynx [[id number]] Collected:  09/06/20 1915    Specimen:  Swab from Nasopharynx Updated:  09/06/20 1921    [place] Draw [[id number]] Collected:  09/06/20 1600    Specimen:  Blood Updated:  09/06/20 1716    Narrative:       The following orders were created for panel order [place] Draw.  Procedure                               Abnormality         Status                     ---------                               -----------         ------                     Light Blue Top[[id number]]                                   Final result               Green Top (Gel)[[id number]]                                  Final result               Lavender Top[[id number]]                                     Final result               Gold Top - SST[[id number]]                                   Final result                 Please view results for these tests on the individual orders.    Light Blue Top [[id number]] Collected:  09/06/20 1600    Specimen:  Blood Updated:  09/06/20 1716     Extra Tube hold for add-on     Comment: Auto resulted       Lavender Top [[id number]] Collected:  09/06/20 1600    Specimen:  Blood Updated:  09/06/20 1716      Extra Tube hold for add-on     Comment: Auto resulted       Gold Top - SST [[id number]] Collected:  09/06/20 1600    Specimen:  Blood Updated:  09/06/20 1716     Extra Tube Hold for add-ons.     Comment: Auto resulted.       Green Top (Gel) [[id number]] Collected:  09/06/20 1600    Specimen:  Blood Updated:  09/06/20 1716     Extra Tube Hold for add-ons.     Comment: Auto resulted.       Troponin [[id number]]  (Normal) Collected:  09/06/20 1600    Specimen:  Blood Updated:  09/06/20 1711     Troponin T <0.010 ng/mL     Narrative:       Troponin T Reference Range:  <= 0.03 ng/mL-   Negative for AMI  >0.03 ng/mL-     Abnormal for myocardial necrosis.  Clinicians would have to utilize clinical acumen, EKG, Troponin and serial changes to determine if it is an Acute Myocardial Infarction or myocardial injury due to an underlying chronic condition.       Results may be falsely decreased if patient taking Biotin.      Comprehensive Metabolic Panel [[id number]]  (Abnormal) Collected:  09/06/20 1600    Specimen:  Blood Updated:  09/06/20 1711     Glucose 110 mg/dL      BUN 17 mg/dL      Creatinine 1.62 mg/dL      Sodium 140 mmol/L      Potassium 4.2 mmol/L      Chloride 108 mmol/L      CO2 22.5 mmol/L      Calcium 9.2 mg/dL      Total Protein 6.8 g/dL      Albumin 4.40 g/dL      ALT (SGPT) 18 U/L      AST (SGOT) 18 U/L      Alkaline Phosphatase 52 U/L      Total Bilirubin 0.7 mg/dL      eGFR Non African Amer 44 mL/min/1.73      Globulin 2.4 gm/dL      A/G Ratio 1.8 g/dL      BUN/Creatinine Ratio 10.5     Anion Gap 9.5 mmol/L     Narrative:       GFR Normal >60  Chronic Kidney Disease <60  Kidney Failure <15      CBC & Differential [[id number]] Collected:  09/06/20 1600    Specimen:  Blood Updated:  09/06/20 1620    Narrative:       The following orders were created for panel order CBC & Differential.  Procedure                               Abnormality         Status                     ---------                                -----------         ------                     CBC Auto Differential[[id number]]        Abnormal            Final result                 Please view results for these tests on the individual orders.    CBC Auto Differential [[id number]]  (Abnormal) Collected:  09/06/20 1600    Specimen:  Blood Updated:  09/06/20 1620     WBC 8.39 10*3/mm3      RBC 4.60 10*6/mm3      Hemoglobin 13.7 g/dL      Hematocrit 41.9 %      MCV 91.1 fL      MCH 29.8 pg      MCHC 32.7 g/dL      RDW 13.4 %      RDW-SD 45.0 fl      MPV 9.8 fL      Platelets 193 10*3/mm3      Neutrophil % 78.8 %      Lymphocyte % 10.3 %      Monocyte % 9.5 %      Eosinophil % 0.6 %      Basophil % 0.6 %      Immature Grans % 0.2 %      Neutrophils, Absolute 6.61 10*3/mm3      Lymphocytes, Absolute 0.86 10*3/mm3      Monocytes, Absolute 0.80 10*3/mm3      Eosinophils, Absolute 0.05 10*3/mm3      Basophils, Absolute 0.05 10*3/mm3      Immature Grans, Absolute 0.02 10*3/mm3      nRBC 0.0 /100 WBC            Imaging:  Imaging Results (Last 24 Hours)     Procedure Component Value Units Date/Time    MRI Brain Without Contrast [[id number]] Collected:  09/07/20 1127     Updated:  09/07/20 1133    Narrative:       MRI BRAIN WO CONTRAST-     INDICATIONS: TIA     TECHNIQUE: Multiplanar multisequence magnetic resonance imaging of the  brain.     COMPARISON: Head CT from 09/06/2020     FINDINGS:     The diffusion-weighted images, correlated with ADC mapping, show no  restricted diffusion to suggest acute infarct.     The midline structures show thinning of the pituitary.     The brain parenchyma shows mild periventricular white matter T2 FLAIR  signal hyperintensity, compatible with chronic small vessel ischemic  change in a patient this age.     Flow voids in the major arteries at the base of the brain appear  unremarkable.     Mild mucosal thickening is seen in the paranasal sinuses. The paranasal  sinuses, mastoid air cells, and orbits otherwise appear unremarkable.        Impression:          No acute infarct. Mild periventricular white matter chronic small vessel  ischemic change.           This report was finalized on 9/7/2020 11:30 AM by Dr. Dylon Garza M.D.       CT Head Without Contrast [[id number]] Collected:  09/06/20 1726     Updated:  09/06/20 1732    Narrative:       CT HEAD WITHOUT CONTRAST, CT CERVICAL SPINE WITHOUT CONTRAST     HISTORY: Left arm and hand tingling and weakness.     TECHNIQUE: CT includes axial imaging from the base of skull to the  vertex without IV contrast. CT cervical spine: Axial imaging from the  skull base to the upper thoracic spine and data reconstructed in coronal  and sagittal planes.     FINDINGS:  HEAD: There are no abnormal areas of increased attenuation intraaxially  suggest hemorrhage. No extra-axial fluid collection is observed. Is no  evidence for cerebral edema or mass effect or shift of midline  structures. Ventricles appear normal for size and configuration. There  is mild bilateral maxillary sinus mucosal thickening.     CERVICAL SPINE: There is straightening of the cervical spine. Vertebral  body heights are within normal limits. There are mild chronic  degenerative changes of the anterior atlantoaxial articulation. No  fracture or acute osseous abnormality is evident.     At C2-C3, central canal and neural foramina appear patent.     At C3-C4, central canal and neural foramina appear patent.     At C4-C5, there is disc space narrowing and there is a broad disc  osteophyte complex eccentric to the left with mild narrowing of the left  aspect of the central canal. Uncovertebral overgrowth is greater on the  left with mild left neural foraminal narrowing.     At C5-C6, there is disc space narrowing. There is a disc osteophyte  complex eccentric to the left, mild-to-moderate narrowing of the left  aspect of the central canal. Uncovertebral overgrowth is present with  moderate left neural foraminal narrowing. Right neural foramen  is  patent.     At C6-C7, there is disc osteophyte complex eccentric to the left with  what appears to be mild narrowing of the left aspect of the central  canal. Evaluation for canal narrowing is limited by the degree of streak  related artifact. The neural foramina appear patent.     At C7-T1, central canal and neural foramina are patent.       Impression:       1. No evidence for acute intracranial abnormality.  2. Multilevel degenerative disc disease within the cervical spine. At  C5-C6, there is a disc/osteophyte complex eccentric to the left with  mild-to-moderate narrowing of the left aspect of the central canal.  Uncovertebral overgrowth is present with moderate left neural foraminal  narrowing. At C6-C7, there is a disc/osteophyte complex eccentric to the  left and there appears to be mild narrowing of the left aspect of the  central canal.     Discussed with JUMA Bergeron in the emergency department  09/06/2020 at 5:20 PM.     Radiation dose reduction techniques were utilized, including automated  exposure control and exposure modulation based on body size.     This report was finalized on 9/6/2020 5:29 PM by Dr. Barrington Blue M.D.       CT Cervical Spine Without Contrast [[id number]] Collected:  09/06/20 1726     Updated:  09/06/20 1732    Narrative:       CT HEAD WITHOUT CONTRAST, CT CERVICAL SPINE WITHOUT CONTRAST     HISTORY: Left arm and hand tingling and weakness.     TECHNIQUE: CT includes axial imaging from the base of skull to the  vertex without IV contrast. CT cervical spine: Axial imaging from the  skull base to the upper thoracic spine and data reconstructed in coronal  and sagittal planes.     FINDINGS:  HEAD: There are no abnormal areas of increased attenuation intraaxially  suggest hemorrhage. No extra-axial fluid collection is observed. Is no  evidence for cerebral edema or mass effect or shift of midline  structures. Ventricles appear normal for size and configuration. There  is  mild bilateral maxillary sinus mucosal thickening.     CERVICAL SPINE: There is straightening of the cervical spine. Vertebral  body heights are within normal limits. There are mild chronic  degenerative changes of the anterior atlantoaxial articulation. No  fracture or acute osseous abnormality is evident.     At C2-C3, central canal and neural foramina appear patent.     At C3-C4, central canal and neural foramina appear patent.     At C4-C5, there is disc space narrowing and there is a broad disc  osteophyte complex eccentric to the left with mild narrowing of the left  aspect of the central canal. Uncovertebral overgrowth is greater on the  left with mild left neural foraminal narrowing.     At C5-C6, there is disc space narrowing. There is a disc osteophyte  complex eccentric to the left, mild-to-moderate narrowing of the left  aspect of the central canal. Uncovertebral overgrowth is present with  moderate left neural foraminal narrowing. Right neural foramen is  patent.     At C6-C7, there is disc osteophyte complex eccentric to the left with  what appears to be mild narrowing of the left aspect of the central  canal. Evaluation for canal narrowing is limited by the degree of streak  related artifact. The neural foramina appear patent.     At C7-T1, central canal and neural foramina are patent.       Impression:       1. No evidence for acute intracranial abnormality.  2. Multilevel degenerative disc disease within the cervical spine. At  C5-C6, there is a disc/osteophyte complex eccentric to the left with  mild-to-moderate narrowing of the left aspect of the central canal.  Uncovertebral overgrowth is present with moderate left neural foraminal  narrowing. At C6-C7, there is a disc/osteophyte complex eccentric to the  left and there appears to be mild narrowing of the left aspect of the  central canal.     Discussed with JUMA Bergeron in the emergency department  09/06/2020 at 5:20 PM.     Radiation  dose reduction techniques were utilized, including automated  exposure control and exposure modulation based on body size.     This report was finalized on 9/6/2020 5:29 PM by Dr. Barrington Blue M.D.       XR Chest 1 View [[id number]] Collected:  09/06/20 1653     Updated:  09/06/20 1658    Narrative:       ONE VIEW PORTABLE CHEST     HISTORY: Chest pain and left arm pain.     FINDINGS: The lungs are well-expanded and clear. The heart is borderline  enlarged and there is no acute disease or change from 03/07/2015.     This report was finalized on 9/6/2020 4:55 PM by Dr. Luis Yoon M.D.         MRI by my independent eyeball review was completely normal  PPE was worn by the patient worn by me washed up before put it on washed up afterwards disposed of everything properly was not within 6 feet of him for more than a few minutes during my exam no aerosols were used    Assessment and Plan:     This patient has suffered a classic migraine.  This is not going to be a stroke or TIA syndrome and I would note that his symptoms first in the left arm,slowly moving to the left leg are not in the same vascular territory, making this almost impossible to have been a TIA syndrome.  I would note that he does have some cervical spondylosis but the symptoms yesterday are not consistent with an acute cervical radiculopathy.  I do believe that the shingles shot likely triggered his migraine and he has a history consistent with classic migraine.  NOTE: I will check carotid Dopplers before he goes home, but otherwise I would not recommend further neurologic work-up at all.  He will continue daily aspirin therapy.    I would note that he is back at his baseline at this time and I do not see any reason for work restrictions based upon this presentation to the hospital.  I will sign off, thanks      Johan Gauthier MD  09/07/20  12:17

## 2020-09-07 NOTE — PLAN OF CARE
Problem: Patient Care Overview  Goal: Plan of Care Review  Flowsheets  Taken 9/7/2020 1002  Plan of Care Reviewed With: patient  Taken 9/7/2020 0958  Outcome Summary: Pt. is a 61 y/o male admitted with concern for possible TIA after developing stroke-like symptoms on L side. Pt. reports symptoms began in L UE and progressed to L LE after receiving a shingles injection in L UE earlier that day. Pt. reports all symptoms have resolved at this time. Pt. able to ambulate 300' without AD and CGA/SBAx1. No noted LOB or unsteadiness. Pt. declined stairs at this time and voices no conern for navigating steps at home once D/C. Pt. with no focal strength or sensation deficits bilaterally. No acute PT needs needed at this time. Pt. denies any questions/concerns. PT will sign off.   Patient was wearing a face mask during this therapy encounter. Therapist used appropriate personal protective equipment including eye protection, mask, and gloves.  Mask used was standard procedure mask. Appropriate PPE was worn during the entire therapy session. Hand hygiene was completed before and after therapy session. Patient is not in enhanced droplet precautions.

## 2020-09-07 NOTE — ACP (ADVANCE CARE PLANNING)
Patient requested information on Advance Directive.  I provided pt an AD pamphlet and explained how it works.  He would like to speak to spouse before completing.

## 2020-09-07 NOTE — NURSING NOTE
Referral per Epic stroke order set for stroke screen. Patient was negative for an acute infarct and was diagnosed with classic migraine. PT, OT and ST have all evaluated patient and signed off as he had no therapy needs. Acute inpatient rehab not required. Will sign off.    Roxanne Tapia RN  Rehab Admissions Nurse  [phone]

## 2020-09-07 NOTE — THERAPY EVALUATION
Patient Name: Benny Rodríguez  : 1960    MRN: [id number]                              Today's Date: 2020       Admit Date: 2020    Visit Dx:     ICD-10-CM ICD-9-CM   1. Cervical spinal stenosis M48.02 723.0   2. Left arm weakness R29.898 729.89   3. Muscle spasm of left lower extremity M62.838 728.85   4. Stroke-like symptom R29.90 781.99     Patient Active Problem List   Diagnosis   • JAI on autoCPAP   • Cervical spinal stenosis   • Left sided numbness   • Suspected COVID-19 virus infection   • TIA (transient ischemic attack)   • Hypertension   • Hyperlipidemia   • Diabetes mellitus (CMS/[facility])   • Coronary artery disease   • CKD (chronic kidney disease)   • KENZIE (acute kidney injury) (CMS/[facility])     Past Medical History:   Diagnosis Date   • Coronary artery disease    • Diabetes mellitus (CMS/[facility])    • Hyperlipidemia    • Hypertension      Past Surgical History:   Procedure Laterality Date   • CORONARY ANGIOPLASTY WITH STENT PLACEMENT       General Information     Row Name 20 0956          PT Evaluation Time/Intention    Document Type  evaluation  -     Mode of Treatment  physical therapy;individual therapy  -     Row Name 20 09          General Information    Patient Profile Reviewed?  yes  -     Prior Level of Function  independent:  -     Existing Precautions/Restrictions  fall  -     Barriers to Rehab  none identified  -     Row Name 20 09          Relationship/Environment    Lives With  spouse  -     Row Name 20 0956          Resource/Environmental Concerns    Current Living Arrangements  home/apartment/condo  -     Row Name 20 0956          Home Main Entrance    Number of Stairs, Main Entrance  two  -     Row Name 20 0956          Stairs Within Home, Primary    Number of Stairs, Within Home, Primary  ten  -     Row Name 20 0956          Cognitive Assessment/Intervention- PT/OT    Orientation Status (Cognition)  oriented x 4  -      Row Name 09/07/20 0956          Safety Issues, Functional Mobility    Impairments Affecting Function (Mobility)  endurance/activity tolerance;pain  -       User Key  (r) = Recorded By, (t) = Taken By, (c) = Cosigned By    Initials Name Provider Type     Kyra Persaud PT Physical Therapist        Mobility     Row Name 09/07/20 0957          Bed Mobility Assessment/Treatment    Bed Mobility Assessment/Treatment  bed mobility (all) activities  -     [place] Level (Bed Mobility)  supervision;nonverbal cues (demo/gesture);verbal cues  -     Assistive Device (Bed Mobility)  bed rails;head of bed elevated  -     Row Name 09/07/20 0957          Sit-Stand Transfer    Sit-Stand [place] (Transfers)  stand by assist;nonverbal cues (demo/gesture);verbal cues  -     Row Name 09/07/20 0957          Gait/Stairs Assessment/Training    Gait/Stairs Assessment/Training  gait/ambulation independence;gait/ambulation assistive device;distance ambulated;gait pattern  -     [place] Level (Gait)  stand by assist;contact guard;nonverbal cues (demo/gesture);verbal cues  -     Distance in Feet (Gait)  300'  -     Pattern (Gait)  step-through;step-to  -     Deviations/Abnormal Patterns (Gait)  stride length decreased;gait speed decreased;terence decreased  -     Bilateral Gait Deviations  forward flexed posture  -     Comment (Gait/Stairs)  no LOB or unsteadiness noted; ambulated without AD  -       User Key  (r) = Recorded By, (t) = Taken By, (c) = Cosigned By    Initials Name Provider Type     Kyra Persaud PT Physical Therapist        Obj/Interventions     Row Name 09/07/20 0958          General ROM    GENERAL ROM COMMENTS  B LE WFL  -     Row Name 09/07/20 0958          MMT (Manual Muscle Testing)    General MMT Comments  B LE 5/5  -     Row Name 09/07/20 0958          Static Sitting Balance    Level of [place] (Unsupported Sitting, Static Balance)  supervision  -     Sitting  Position (Unsupported Sitting, Static Balance)  sitting on edge of bed  -[facility] Name 09/07/20 0958          Dynamic Sitting Balance    Level of [place], Reaches Outside Midline (Sitting, Dynamic Balance)  supervision  -     Sitting Position, Reaches Outside Midline (Sitting, Dynamic Balance)  sitting on edge of bed  -[facility] Name 09/07/20 0958          Static Standing Balance    Level of [place] (Supported Standing, Static Balance)  standby assist  -MH     Row Name 09/07/20 0958          Dynamic Standing Balance    Level of [place], Reaches Outside Midline (Standing, Dynamic Balance)  standby assist  -MH     Row Name 09/07/20 0958          Sensory Assessment/Intervention    Sensory General Assessment  no sensation deficits identified  -       User Key  (r) = Recorded By, (t) = Taken By, (c) = Cosigned By    Initials Name Provider Type     Kyra Persaud, PT Physical Therapist        Goals/Plan     Row Name 09/07/20 1001          Bed Mobility Goal 1 (PT)    Activity/Assistive Device (Bed Mobility Goal 1, PT)  bed mobility activities, all  -     [place] Level/Cues Needed (Bed Mobility Goal 1, PT)  standby assist  -     Progress/Outcomes (Bed Mobility Goal 1, PT)  goal met  -MH     Row Name 09/07/20 1001          Transfer Goal 1 (PT)    Activity/Assistive Device (Transfer Goal 1, PT)  transfers, all  -MH     [place] Level/Cues Needed (Transfer Goal 1, PT)  standby assist  -     Progress/Outcome (Transfer Goal 1, PT)  goal met  -MH     Row Name 09/07/20 1001          Gait Training Goal 1 (PT)    Activity/Assistive Device (Gait Training Goal 1, PT)  gait (walking locomotion)  -     [place] Level (Gait Training Goal 1, PT)  standby assist  -     Distance (Gait Goal 1, PT)  300'  -MH     Progress/Outcome (Gait Training Goal 1, PT)  goal met  -       User Key  (r) = Recorded By, (t) = Taken By, (c) = Cosigned By    Initials Name Provider Type    Kyra Boggs, PT  Physical Therapist        Clinical Impression     Row Name 09/07/20 0958          Pain Assessment    Additional Documentation  Pain Scale: Numbers Pre/Post-Treatment (Group)  -     Row Name 09/07/20 0958          Pain Scale: Numbers Pre/Post-Treatment    Pain Scale: Numbers, Pretreatment  0/10 - no pain  -     Pain Scale: Numbers, Post-Treatment  0/10 - no pain  -     Row Name 09/07/20 0958          Plan of Care Review    Plan of Care Reviewed With  patient  -MH     Outcome Summary  Pt. is a 59 y/o male admitted with concern for possible TIA after developing stroke-like symptoms on L side. Pt. reports symptoms began in L UE and progressed to L LE after receiving a shingles injection in L UE earlier that day. Pt. reports all symptoms have resolved at this time. Pt. able to ambulate 300' without AD and CGA/SBAx1. No noted LOB or unsteadiness. Pt. declined stairs at this time and voices no conern for navigating steps at home once D/C. Pt. with no focal strength or sensation deficits bilaterally. No acute PT needs needed at this time. Pt. denies any questions/concerns. PT will sign off.  -     Row Name 09/07/20 0958          Positioning and Restraints    Pre-Treatment Position  in bed  -MH     Post Treatment Position  bed  -MH     In Bed  supine;call light within reach;encouraged to call for assist  -       User Key  (r) = Recorded By, (t) = Taken By, (c) = Cosigned By    Initials Name Provider Type    Kyra Boggs, PT Physical Therapist        Outcome Measures     Row Name 09/07/20 1002          How much help from another person do you currently need...    Turning from your back to your side while in flat bed without using bedrails?  4  -MH     Moving from lying on back to sitting on the side of a flat bed without bedrails?  4  -MH     Moving to and from a bed to a chair (including a wheelchair)?  4  -MH     Standing up from a chair using your arms (e.g., wheelchair, bedside chair)?  4  -MH     Climbing  3-5 steps with a railing?  3  -MH     To walk in hospital room?  4  -     AM-PAC 6 Clicks Score (PT)  23  -     Row Name 09/07/20 1002          Functional Assessment    Outcome Measure Options  AM-PAC 6 Clicks Basic Mobility (PT)  -       User Key  (r) = Recorded By, (t) = Taken By, (c) = Cosigned By    Initials Name Provider Type     Kyra Persaud PT Physical Therapist        Physical Therapy Education                 Title: PT OT SLP Therapies (In Progress)     Topic: Physical Therapy (In Progress)     Point: Mobility training (Done)     Description:   Instruct learner(s) on safety and technique for assisting patient out of bed, chair or wheelchair.  Instruct in the proper use of assistive devices, such as walker, crutches, cane or brace.              Patient Friendly Description:   It's important to get you on your feet again, but we need to do so in a way that is safe for you. Falling has serious consequences, and your personal safety is the most important thing of all.        When it's time to get out of bed, one of us or a family member will sit next to you on the bed to give you support.     If your doctor or nurse tells you to use a walker, crutches, a cane, or a brace, be sure you use it every time you get out of bed, even if you think you don't need it.    Learning Progress Summary           Patient Acceptance, E,TB,D, VU,DU,NR by  at 9/7/2020 1002                   Point: Home exercise program (Not Started)     Description:   Instruct learner(s) on appropriate technique for monitoring, assisting and/or progressing patient with therapeutic exercises and activities.              Learner Progress:   Not documented in this visit.          Point: Body mechanics (Done)     Description:   Instruct learner(s) on proper positioning and spine alignment for patient and/or caregiver during mobility tasks and/or exercises.              Learning Progress Summary           Patient Acceptance, E,TB,D,  ISAIAH MURPHY,NR by  at 9/7/2020 1002                   Point: Precautions (Done)     Description:   Instruct learner(s) on prescribed precautions during mobility and gait tasks              Learning Progress Summary           Patient Acceptance, E,TB,D, KATHERINE,ISAIAH,NR by  at 9/7/2020 1002                               User Key     Initials Effective Dates Name Provider Type [facility] 05/26/20 -  Kyra Persaud, PT Physical Therapist PT              PT Recommendation and Plan  Planned Therapy Interventions (PT Eval): balance training, bed mobility training, gait training, home exercise program, patient/family education, postural re-education, ROM (range of motion), stair training, strengthening, stretching, transfer training  Plan of Care Reviewed With: patient  Outcome Summary: Pt. is a 61 y/o male admitted with concern for possible TIA after developing stroke-like symptoms on L side. Pt. reports symptoms began in L UE and progressed to L LE after receiving a shingles injection in L UE earlier that day. Pt. reports all symptoms have resolved at this time. Pt. able to ambulate 300' without AD and CGA/SBAx1. No noted LOB or unsteadiness. Pt. declined stairs at this time and voices no conern for navigating steps at home once D/C. Pt. with no focal strength or sensation deficits bilaterally. No acute PT needs needed at this time. Pt. denies any questions/concerns. PT will sign off.     Time Calculation:   PT Charges     Row Name 09/07/20 1003             Time Calculation    Start Time  0905  -      Stop Time  0919  -      Time Calculation (min)  14 min  -      PT Received On  09/07/20  -         Time Calculation- PT    Total Timed Code Minutes- PT  12 minute(s)  -        User Key  (r) = Recorded By, (t) = Taken By, (c) = Cosigned By    Initials Name Provider Type     Kyra Persaud, PT Physical Therapist        Therapy Charges for Today     Code Description Service Date Service Provider Modifiers Qty     50331610875 HC PT THERAPEUTIC ACT EA 15 MIN 9/7/2020 Kyra Persaud, PT GP 1    17631329411 HC PT EVAL LOW COMPLEXITY 2 9/7/2020 Kyra Persaud, PT GP 1          PT G-Codes  Outcome Measure Options: AM-PAC 6 Clicks Basic Mobility (PT)  AM-PAC 6 Clicks Score (PT): 23  AM-PAC 6 Clicks Score (OT): 24  Modified [place] Scale: 0 - No Symptoms at all.    Kyra Persaud, PT  9/7/2020

## 2020-09-07 NOTE — PLAN OF CARE
Admission completed, issues resolved at this time. Swallow study passed, no other issues    Problem: Skin Injury Risk (Adult)  Goal: Identify Related Risk Factors and Signs and Symptoms  Description  Related risk factors and signs and symptoms are identified upon initiation of Human Response Clinical Practice Guideline (CPG).  Outcome: Ongoing (interventions implemented as appropriate)  Flowsheets (Taken 9/6/2020 2102)  Related Risk Factors (Skin Injury Risk): medication  Goal: Skin Health and Integrity  Description  Patient will demonstrate the desired outcomes by discharge/transition of care.  Outcome: Ongoing (interventions implemented as appropriate)     Problem: Pain, Chronic (Adult)  Goal: Identify Related Risk Factors and Signs and Symptoms  Description  Related risk factors and signs and symptoms are identified upon initiation of Human Response Clinical Practice Guideline (CPG).  Outcome: Ongoing (interventions implemented as appropriate)  Goal: Acceptable Pain/Comfort Level and Functional Ability  Description  Patient will demonstrate the desired outcomes by discharge/transition of care.  Outcome: Ongoing (interventions implemented as appropriate)     Problem: Fall Risk (Adult)  Goal: Identify Related Risk Factors and Signs and Symptoms  Description  Related risk factors and signs and symptoms are identified upon initiation of Human Response Clinical Practice Guideline (CPG).  Outcome: Ongoing (interventions implemented as appropriate)  Goal: Absence of Fall  Description  Patient will demonstrate the desired outcomes by discharge/transition of care.  Outcome: Ongoing (interventions implemented as appropriate)

## 2020-09-07 NOTE — THERAPY EVALUATION
Acute Care - Occupational Therapy Initial Evaluation  [facility]     Patient Name: Benny Rodríguez  : 1960  MRN: [id number]  Today's Date: 2020             Admit Date: 2020       ICD-10-CM ICD-9-CM   1. Cervical spinal stenosis M48.02 723.0   2. Left arm weakness R29.898 729.89   3. Muscle spasm of left lower extremity M62.838 728.85   4. Stroke-like symptom R29.90 781.99     Patient Active Problem List   Diagnosis   • JAI on autoCPAP   • Cervical spinal stenosis   • Left sided numbness   • Suspected COVID-19 virus infection   • TIA (transient ischemic attack)   • Hypertension   • Hyperlipidemia   • Diabetes mellitus (CMS/[facility])   • Coronary artery disease   • CKD (chronic kidney disease)   • KENZIE (acute kidney injury) (CMS/[facility])     Past Medical History:   Diagnosis Date   • Coronary artery disease    • Diabetes mellitus (CMS/[facility])    • Hyperlipidemia    • Hypertension      Past Surgical History:   Procedure Laterality Date   • CORONARY ANGIOPLASTY WITH STENT PLACEMENT            OT ASSESSMENT FLOWSHEET (last 12 hours)      Occupational Therapy Evaluation     Row Name 20 0831                   OT Evaluation Time/Intention    Subjective Information  no complaints  -SM        Document Type  evaluation  -SM        Mode of Treatment  occupational therapy  -SM        Patient Effort  good  -SM        Symptoms Noted During/After Treatment  none  -SM           General Information    Patient Profile Reviewed?  yes  -SM        Patient Observations  alert;cooperative;agree to therapy  -SM        General Observations of Patient  Pt sitting up in bed, feeding self breakfast with L hand.   -SM        Prior Level of Function  independent:;ADL's;driving;all household mobility  -SM        Equipment Currently Used at Home  none  -SM        Existing Precautions/Restrictions  fall  -SM           Relationship/Environment    Lives With  spouse  -SM           Cognitive Assessment/Intervention- PT/OT    Orientation  Status (Cognition)  oriented x 4  -SM        Follows Commands (Cognition)  WFL  -SM           Bed Mobility Assessment/Treatment    Bed Mobility Assessment/Treatment  bed mobility (all) activities  -SM        [place] Level (Bed Mobility)  conditional independence  -SM           Transfer Assessment/Treatment    Transfer Assessment/Treatment  sit-stand transfer  -SM           Sit-Stand Transfer    Sit-Stand [place] (Transfers)  supervision  -           ADL Assessment/Intervention    BADL Assessment/Intervention  feeding  -SM           Self-Feeding Assessment/Training    [place] Level (Feeding)  scoop food and bring to mouth;knife use;prepare tray/open items;conditional independence  -SM        Position (Self-Feeding)  sitting up in bed  -SM        Comment (Feeding)  using LUE with good coordination. no dropping items.   -SM           General ROM    GENERAL ROM COMMENTS  BUE WFL grossly  -SM           MMT (Manual Muscle Testing)    General MMT Comments  BUE 5/5 grossly   -SM           Motor Assessment/Interventions    Additional Documentation  Fine Motor Testing & Training (Group);Gross Motor Coordination (Group)  -SM           Gross Motor Coordination    Gross Motor Skill, Impairments Detail  no decifits noted.   -SM           Fine Motor Testing & Training    Comment, Fine Motor Coordination  no deficits noted.   -SM           Sensory Assessment/Intervention    Sensory General Assessment  no sensation deficits identified  -SM           Positioning and Restraints    Pre-Treatment Position  in bed  -SM        Post Treatment Position  bed  -SM        In Bed  encouraged to call for assist;call light within reach;sitting  -SM           Pain Assessment    Additional Documentation  Pain Scale: Numbers Pre/Post-Treatment (Group)  -SM           Pain Scale: Numbers Pre/Post-Treatment    Pain Scale: Numbers, Pretreatment  0/10 - no pain  -SM        Pain Scale: Numbers, Post-Treatment  0/10 - no pain  -SM         "   Plan of Care Review    Plan of Care Reviewed With  patient  -SM        Outcome Summary  PT is a 60 y.o male admitted to [facility] after stroke like symptoms affecting the L side. Pt reports all symptoms have resolved at time of OT eval. No deficits noted in LUE strength, coordination, or sensation. Pt is eating breakfast with LUE demo'ing good functional use of LUE. No acute needs for OT at current time.   -SM           Clinical Impression (OT)    Functional Level at Time of Evaluation (OT Eval)  Pt at [facility], no LUE deficits noted.   -SM        Patient/Family Goals Statement (OT Eval)  \"To rule out TIA vs. reaction to vaccine.\"  -SM        Criteria for Skilled Therapeutic Interventions Met (OT Eval)  treatment indicated  -SM        Rehab Potential (OT Eval)  good, to achieve stated therapy goals  -SM        Therapy Frequency (OT Eval)  evaluation only  -SM        Care Plan Review (OT)  evaluation/treatment results reviewed;care plan/treatment goals reviewed;patient/other agree to care plan  -SM        Anticipated Discharge Disposition (OT)  home  -SM           Patient Education Goal (OT)    Activity (Patient Education Goal, OT)  Pt to demo safe technique for ADLs and transfers.   -SM        [place]/Cues/Accuracy (Memory Goal 2, OT)  verbalizes understanding;demonstrates adequately  -SM        Time Frame (Patient Education Goal, OT)  short term goal (STG);by discharge  -SM        Progress/Outcome (Patient Education Goal, OT)  goal met  -SM          User Key  (r) = Recorded By, (t) = Taken By, (c) = Cosigned By    Initials Name Effective Dates     Peri Meza, OT 04/02/20 -          Occupational Therapy Education                 Title: PT OT SLP Therapies (In Progress)     Topic: Occupational Therapy (In Progress)     Point: ADL training (Done)     Description:   Instruct learner(s) on proper safety adaptation and remediation techniques during self care or transfers.   Instruct in proper use of assistive " devices.              Learning Progress Summary           Patient Acceptance, E, VU by  at 9/7/2020 0844    Comment:  Pt educated in role of OT post stroke and POC.                   Point: Home exercise program (Not Started)     Description:   Instruct learner(s) on appropriate technique for monitoring, assisting and/or progressing therapeutic exercises/activities.              Learner Progress:   Not documented in this visit.          Point: Precautions (Done)     Description:   Instruct learner(s) on prescribed precautions during self-care and functional transfers.              Learning Progress Summary           Patient Acceptance, E, VU by  at 9/7/2020 0844    Comment:  Pt educated in role of OT post stroke and POC.                   Point: Body mechanics (Not Started)     Description:   Instruct learner(s) on proper positioning and spine alignment during self-care, functional mobility activities and/or exercises.              Learner Progress:   Not documented in this visit.                      User Key     Initials Effective Dates Name Provider Type Discipline     04/02/20 -  Peri Meza OT Occupational Therapist OT                  OT Recommendation and Plan  Outcome Summary/Treatment Plan (OT)  Anticipated Discharge Disposition (OT): home  Therapy Frequency (OT Eval): evaluation only  Plan of Care Review  Plan of Care Reviewed With: patient  Plan of Care Reviewed With: patient  Outcome Summary: PT is a 60 y.o male admitted to [facility] after stroke like symptoms affecting the L side. Pt reports all symptoms have resolved at time of OT eval. No deficits noted in LUE strength, coordination, or sensation. Pt is eating breakfast with LUE demo'ing good functional use of LUE. No acute needs for OT at current time.     Outcome Measures     Row Name 09/07/20 0800             How much help from another is currently needed...    Putting on and taking off regular lower body clothing?  4  -      Bathing  (including washing, rinsing, and drying)  4  -SM      Toileting (which includes using toilet bed pan or urinal)  4  -SM      Putting on and taking off regular upper body clothing  4  -SM      Taking care of personal grooming (such as brushing teeth)  4  -SM      Eating meals  4  -SM      AM-PAC 6 Clicks Score (OT)  24  -SM         Modified [place] Scale    Modified [place] Scale  0 - No Symptoms at all.  -SM         Functional Assessment    Outcome Measure Options  AM-PAC 6 Clicks Daily Activity (OT);Modified [place]  -        User Key  (r) = Recorded By, (t) = Taken By, (c) = Cosigned By    Initials Name Provider Type    Peri Dumont OT Occupational Therapist          Time Calculation:   Time Calculation- OT     Row Name 09/07/20 0847             Time Calculation- OT    OT Start Time  0820  -SM      OT Stop Time  0829  -SM      OT Time Calculation (min)  9 min  -      Total Timed Code Minutes- OT  0 minute(s)  -SM      OT Received On  09/07/20  -        User Key  (r) = Recorded By, (t) = Taken By, (c) = Cosigned By    Initials Name Provider Type    Peri Dumont OT Occupational Therapist        Therapy Charges for Today     Code Description Service Date Service Provider Modifiers Qty    96415657213 HC OT EVAL MOD COMPLEXITY 2 9/7/2020 Peri Meza OT GO 1               Peri Meza OT  9/7/2020

## 2020-09-07 NOTE — DISCHARGE SUMMARY
Patient Name: Benny Rodríguez  : 1960  MRN: [id number]    Date of Admission: 2020  Date of Discharge:  2020  Primary Care Physician: Marcos Silva MD      Chief Complaint:   Extremity Weakness and Pain (left sided)      Discharge Diagnoses     Active Hospital Problems    Diagnosis  POA   • **Headache, migraine [G43.909]  Unknown   • Cervical spinal stenosis [M48.02]  Yes   • Left sided numbness [R20.0]  Unknown   • Hypertension [I10]  Unknown   • Hyperlipidemia [E78.5]  Unknown   • Diabetes mellitus (CMS/HCC) [E11.9]  Unknown   • Coronary artery disease [I25.10]  Unknown   • CKD (chronic kidney disease) [N18.9]  Unknown   • KENZIE (acute kidney injury) (CMS/HCC) [N17.9]  Unknown   • JAI on autoCPAP [G47.33, Z99.89]  Not Applicable      Resolved Hospital Problems    Diagnosis Date Resolved POA   • Suspected COVID-19 virus infection [Z20.828] 2020 Unknown   • TIA (transient ischemic attack) [G45.9] 2020 Unknown        Hospital Course     Mr. Rodríguez is a 60 y.o. male with a history of DM, CAD, HTN, HLD, CKD who presented to [facility] initially complaining of LUE discomfort following shingles vaccine with acute onset of LUE weakness, numbness/tingling as well as facial and lower extremity weakness and dull headache. He also reported watery drainage of lt eye and lt nare .  Please see the admitting history and physical for further details.  There was concern for TIA as symptoms were completely resolved in ED; he was admitted to the hospital for further evaluation and treatment.  CTH and MRI brain were negative. CT C spine showed degenerative changes with some narrowing C5-6 & C6-7. Neurology evaluated who felt pt's symptoms were not due to TIA or stroke but likely migraine triggered by shingles vaccine. He is undergoing carotid dopplers for completeness of workup. Neurology is recommending to continue daily ASA therapy; no further neurological workup. [facility] has also  been consulted to review CT C spine; pt can follow up as outpatient. Cr has improved w/IVF's. A1C 6.5%. LDL is less than 70. He has returned to baseline and is stable for discharge.            Day of Discharge     Feels well. No additional numbness/weakness. Has been evaluated by OT/PT.     Physical Exam:  Temp:  [96.9 °F (36.1 °C)-98.5 °F (36.9 °C)] 97.3 °F (36.3 °C)  Heart Rate:  [58-85] 63  Resp:  [16-18] 16  BP: (106-159)/(61-96) 132/74  Body mass index is 36.72 kg/m².  Physical Exam   Constitutional: He is oriented to person, place, and time. He appears well-developed. No distress.   HENT:   Head: Normocephalic.   Eyes: Conjunctivae are normal.   Neck: Neck supple. No JVD present.   Cardiovascular: Normal rate and regular rhythm.   Pulmonary/Chest: Effort normal and breath sounds normal. No respiratory distress.   Abdominal: Soft. Bowel sounds are normal.   Musculoskeletal: Normal range of motion. He exhibits no edema.   Neurological: He is alert and oriented to person, place, and time. No cranial nerve deficit or sensory deficit.   Skin: Skin is warm and dry.   Psychiatric: He has a normal mood and affect. Thought content normal.   Vitals reviewed.      Consultants     Consult Orders (all) (From admission, onward)     Start     Ordered    09/06/20 2105  Inpatient Consult to Advance Care Planning  Once     Provider:  (Not yet assigned)    09/06/20 2108 09/06/20 2014  Notify Stroke Coordinator  Once     Provider:  (Not yet assigned)    09/06/20 2013 09/06/20 2014  Inpatient Rehab Admission Consult  Once     Provider:  (Not yet assigned)    09/06/20 2013 09/06/20 2014  Consult to Case Management   Once     Provider:  (Not yet assigned)    09/06/20 2013 09/06/20 2014  Consult to Diabetes Educator  Once     Provider:  (Not yet assigned)    09/06/20 2013 09/06/20 2014  Inpatient Neurology Consult Stroke  Once     Specialty:  Neurology  Provider:  Peter Nagy MD    09/06/20  2013 09/06/20 2014  Inpatient consult to Neurosurgery  Once     Specialty:  Neurosurgery  Provider:  Gómez Soriano MD    09/06/20 2013 09/06/20 1758  [facility] (on-call MD unless specified) Details  Once     Specialty:  Hospitalist  Provider:  (Not yet assigned)    09/06/20 1757 09/06/20 1758  Inpatient Neurology Consult Stroke  Once     Specialty:  Neurology  Provider:  (Not yet assigned)    09/06/20 1757              Procedures     Imaging Results (All)     Procedure Component Value Units Date/Time    MRI Brain Without Contrast [[id number]] Collected:  09/07/20 1127     Updated:  09/07/20 1133    Narrative:       MRI BRAIN WO CONTRAST-     INDICATIONS: TIA     TECHNIQUE: Multiplanar multisequence magnetic resonance imaging of the  brain.     COMPARISON: Head CT from 09/06/2020     FINDINGS:     The diffusion-weighted images, correlated with ADC mapping, show no  restricted diffusion to suggest acute infarct.     The midline structures show thinning of the pituitary.     The brain parenchyma shows mild periventricular white matter T2 FLAIR  signal hyperintensity, compatible with chronic small vessel ischemic  change in a patient this age.     Flow voids in the major arteries at the base of the brain appear  unremarkable.     Mild mucosal thickening is seen in the paranasal sinuses. The paranasal  sinuses, mastoid air cells, and orbits otherwise appear unremarkable.       Impression:          No acute infarct. Mild periventricular white matter chronic small vessel  ischemic change.           This report was finalized on 9/7/2020 11:30 AM by Dr. Dylon Garza M.D.       CT Head Without Contrast [[id number]] Collected:  09/06/20 1726     Updated:  09/06/20 1732    Narrative:       CT HEAD WITHOUT CONTRAST, CT CERVICAL SPINE WITHOUT CONTRAST     HISTORY: Left arm and hand tingling and weakness.     TECHNIQUE: CT includes axial imaging from the base of skull to the  vertex without IV contrast. CT cervical  spine: Axial imaging from the  skull base to the upper thoracic spine and data reconstructed in coronal  and sagittal planes.     FINDINGS:  HEAD: There are no abnormal areas of increased attenuation intraaxially  suggest hemorrhage. No extra-axial fluid collection is observed. Is no  evidence for cerebral edema or mass effect or shift of midline  structures. Ventricles appear normal for size and configuration. There  is mild bilateral maxillary sinus mucosal thickening.     CERVICAL SPINE: There is straightening of the cervical spine. Vertebral  body heights are within normal limits. There are mild chronic  degenerative changes of the anterior atlantoaxial articulation. No  fracture or acute osseous abnormality is evident.     At C2-C3, central canal and neural foramina appear patent.     At C3-C4, central canal and neural foramina appear patent.     At C4-C5, there is disc space narrowing and there is a broad disc  osteophyte complex eccentric to the left with mild narrowing of the left  aspect of the central canal. Uncovertebral overgrowth is greater on the  left with mild left neural foraminal narrowing.     At C5-C6, there is disc space narrowing. There is a disc osteophyte  complex eccentric to the left, mild-to-moderate narrowing of the left  aspect of the central canal. Uncovertebral overgrowth is present with  moderate left neural foraminal narrowing. Right neural foramen is  patent.     At C6-C7, there is disc osteophyte complex eccentric to the left with  what appears to be mild narrowing of the left aspect of the central  canal. Evaluation for canal narrowing is limited by the degree of streak  related artifact. The neural foramina appear patent.     At C7-T1, central canal and neural foramina are patent.       Impression:       1. No evidence for acute intracranial abnormality.  2. Multilevel degenerative disc disease within the cervical spine. At  C5-C6, there is a disc/osteophyte complex eccentric to  the left with  mild-to-moderate narrowing of the left aspect of the central canal.  Uncovertebral overgrowth is present with moderate left neural foraminal  narrowing. At C6-C7, there is a disc/osteophyte complex eccentric to the  left and there appears to be mild narrowing of the left aspect of the  central canal.     Discussed with JUMA Bergeron in the emergency department  09/06/2020 at 5:20 PM.     Radiation dose reduction techniques were utilized, including automated  exposure control and exposure modulation based on body size.     This report was finalized on 9/6/2020 5:29 PM by Dr. Barrington Blue M.D.       CT Cervical Spine Without Contrast [[id number]] Collected:  09/06/20 1726     Updated:  09/06/20 1732    Narrative:       CT HEAD WITHOUT CONTRAST, CT CERVICAL SPINE WITHOUT CONTRAST     HISTORY: Left arm and hand tingling and weakness.     TECHNIQUE: CT includes axial imaging from the base of skull to the  vertex without IV contrast. CT cervical spine: Axial imaging from the  skull base to the upper thoracic spine and data reconstructed in coronal  and sagittal planes.     FINDINGS:  HEAD: There are no abnormal areas of increased attenuation intraaxially  suggest hemorrhage. No extra-axial fluid collection is observed. Is no  evidence for cerebral edema or mass effect or shift of midline  structures. Ventricles appear normal for size and configuration. There  is mild bilateral maxillary sinus mucosal thickening.     CERVICAL SPINE: There is straightening of the cervical spine. Vertebral  body heights are within normal limits. There are mild chronic  degenerative changes of the anterior atlantoaxial articulation. No  fracture or acute osseous abnormality is evident.     At C2-C3, central canal and neural foramina appear patent.     At C3-C4, central canal and neural foramina appear patent.     At C4-C5, there is disc space narrowing and there is a broad disc  osteophyte complex eccentric to the  left with mild narrowing of the left  aspect of the central canal. Uncovertebral overgrowth is greater on the  left with mild left neural foraminal narrowing.     At C5-C6, there is disc space narrowing. There is a disc osteophyte  complex eccentric to the left, mild-to-moderate narrowing of the left  aspect of the central canal. Uncovertebral overgrowth is present with  moderate left neural foraminal narrowing. Right neural foramen is  patent.     At C6-C7, there is disc osteophyte complex eccentric to the left with  what appears to be mild narrowing of the left aspect of the central  canal. Evaluation for canal narrowing is limited by the degree of streak  related artifact. The neural foramina appear patent.     At C7-T1, central canal and neural foramina are patent.       Impression:       1. No evidence for acute intracranial abnormality.  2. Multilevel degenerative disc disease within the cervical spine. At  C5-C6, there is a disc/osteophyte complex eccentric to the left with  mild-to-moderate narrowing of the left aspect of the central canal.  Uncovertebral overgrowth is present with moderate left neural foraminal  narrowing. At C6-C7, there is a disc/osteophyte complex eccentric to the  left and there appears to be mild narrowing of the left aspect of the  central canal.     Discussed with JUMA Bergeron in the emergency department  09/06/2020 at 5:20 PM.     Radiation dose reduction techniques were utilized, including automated  exposure control and exposure modulation based on body size.     This report was finalized on 9/6/2020 5:29 PM by Dr. Barrington Blue M.D.       XR Chest 1 View [[id number]] Collected:  09/06/20 1653     Updated:  09/06/20 1658    Narrative:       ONE VIEW PORTABLE CHEST     HISTORY: Chest pain and left arm pain.     FINDINGS: The lungs are well-expanded and clear. The heart is borderline  enlarged and there is no acute disease or change from 03/07/2015.     This report was  finalized on 9/6/2020 4:55 PM by Dr. Luis Yoon M.D.             Pertinent Labs     Results from last 7 days   Lab Units 09/07/20  0602 09/06/20  1600   WBC 10*3/mm3 6.45 8.39   HEMOGLOBIN g/dL 14.4 13.7   PLATELETS 10*3/mm3 169 193     Results from last 7 days   Lab Units 09/07/20  0602 09/06/20  1600   SODIUM mmol/L 142 140   POTASSIUM mmol/L 4.1 4.2   CHLORIDE mmol/L 108* 108*   CO2 mmol/L 23.9 22.5   BUN mg/dL 14 17   CREATININE mg/dL 1.48* 1.62*   GLUCOSE mg/dL 105* 110*   Estimated Creatinine Clearance: 64.9 mL/min (A) (by C-G formula based on SCr of 1.48 mg/dL (H)).  Results from last 7 days   Lab Units 09/07/20  0602 09/06/20  1600   ALBUMIN g/dL 4.30 4.40   BILIRUBIN mg/dL 0.7 0.7   ALK PHOS U/L 53 52   AST (SGOT) U/L 18 18   ALT (SGPT) U/L 16 18     Results from last 7 days   Lab Units 09/07/20  0602 09/06/20  1600   CALCIUM mg/dL 8.7 9.2   ALBUMIN g/dL 4.30 4.40       Results from last 7 days   Lab Units 09/06/20  1600   TROPONIN T ng/mL <0.010       Results from last 7 days   Lab Units 09/07/20  0602   CHOLESTEROL mg/dL 105   TRIGLYCERIDES mg/dL 109   HDL CHOL mg/dL 37*   LDL CHOL mg/dL 46           Test Results Pending at Discharge       Discharge Details        Discharge Medications      Continue These Medications      Instructions Start Date   ascorbic acid 1000 MG tablet  Commonly known as:  VITAMIN C   1,000 mg, Oral, Daily      aspirin 81 MG chewable tablet   81 mg, Oral, Daily      atorvastatin 20 MG tablet  Commonly known as:  LIPITOR   20 mg, Oral, Daily      Fish Oil Ultra 1400 MG capsule   1,400 mg, Oral, Daily      glimepiride 4 MG tablet  Commonly known as:  AMARYL   4 mg, Oral, Every Morning Before Breakfast      lisinopril 10 MG tablet  Commonly known as:  PRINIVIL,ZESTRIL   10 mg, Oral, Daily      rOPINIRole 1 MG tablet  Commonly known as:  REQUIP   1 mg, Oral, Nightly, Take 1 hour before bedtime.      vitamin B-12 1000 MCG tablet  Commonly known as:  CYANOCOBALAMIN   1,000 mcg, Oral,  Daily             Allergies   Allergen Reactions   • Ampicillin Unknown (See Comments)     Pt does not know reaction         Discharge Disposition:  Home or Self Care    Discharge Diet:  Diet Order   Procedures   • Diet Regular; Cardiac, Consistent Carbohydrate       Discharge Activity:   Activity Instructions     Activity as Tolerated      Work Restrictions      Type of Restriction:  Work    May Return to Work:  Other    Return to Work Instructions:  Wednesday 9/9/20          CODE STATUS:    Code Status and Medical Interventions:   Ordered at: 09/06/20 1928     Code Status:    CPR     Medical Interventions (Level of Support Prior to Arrest):    Full       No future appointments.  Follow-up Information     Marcos Silva MD. Schedule an appointment as soon as possible for a visit in 1 week(s).    Specialty:  General Practice  Contact information:  [address] [facility] [address] [phone]             [facility]. Schedule an appointment as soon as possible for a visit in 2 week(s).    Specialty:  Neurosurgery  Why:  Cervical stenosis  Contact information:  [address]  [address] [address] [phone]  Additional information:  Phone Number: [phone]      Located on [facility]. We are in the building with [address] in large black letters.                 Time Spent on Discharge:  Greater than 30 minutes      JUMA Lala  [facility]  09/07/20  4:24 PM

## 2021-01-20 ENCOUNTER — HOSPITAL ENCOUNTER (EMERGENCY)
Facility: HOSPITAL | Age: 61
Discharge: HOME | End: 2021-01-21
Attending: EMERGENCY MEDICINE | Admitting: EMERGENCY MEDICINE

## 2021-01-20 ENCOUNTER — APPOINTMENT (OUTPATIENT)
Dept: GENERAL RADIOLOGY | Facility: HOSPITAL | Age: 61
End: 2021-01-20

## 2021-01-20 DIAGNOSIS — Z77.29 CARBON MONOXIDE EXPOSURE: Primary | ICD-10-CM

## 2021-01-20 LAB
ALBUMIN SERPL-MCNC: 4.2 G/DL (ref 3.5–5.2)
ALBUMIN/GLOB SERPL: 1.9 G/DL
ALP SERPL-CCNC: 54 U/L (ref 39–117)
ALT SERPL W P-5'-P-CCNC: 23 U/L (ref 1–41)
ANION GAP SERPL CALCULATED.3IONS-SCNC: 10.7 MMOL/L (ref 5–15)
AST SERPL-CCNC: 17 U/L (ref 1–40)
BASOPHILS # BLD AUTO: 0.04 10*3/MM3 (ref 0–0.2)
BASOPHILS NFR BLD AUTO: 0.5 % (ref 0–1.5)
BDY SITE: ABNORMAL
BILIRUB SERPL-MCNC: 0.3 MG/DL (ref 0–1.2)
BUN SERPL-MCNC: 14 MG/DL (ref 8–23)
BUN/CREAT SERPL: 11.3 (ref 7–25)
CALCIUM SPEC-SCNC: 9.2 MG/DL (ref 8.6–10.5)
CHLORIDE SERPL-SCNC: 110 MMOL/L (ref 98–107)
CO2 SERPL-SCNC: 22.3 MMOL/L (ref 22–29)
COHGB MFR BLD: 5.2 % (ref 0–1.5)
CREAT SERPL-MCNC: 1.24 MG/DL (ref 0.76–1.27)
DEPRECATED RDW RBC AUTO: 42.9 FL (ref 37–54)
EOSINOPHIL # BLD AUTO: 0.17 10*3/MM3 (ref 0–0.4)
EOSINOPHIL NFR BLD AUTO: 2 % (ref 0.3–6.2)
ERYTHROCYTE [DISTWIDTH] IN BLOOD BY AUTOMATED COUNT: 13.2 % (ref 12.3–15.4)
GFR SERPL CREATININE-BSD FRML MDRD: 59 ML/MIN/1.73
GLOBULIN UR ELPH-MCNC: 2.2 GM/DL
GLUCOSE SERPL-MCNC: 214 MG/DL (ref 65–99)
HCT VFR BLD AUTO: 41.6 % (ref 37.5–51)
HGB BLD-MCNC: 13.8 G/DL (ref 13–17.7)
IMM GRANULOCYTES # BLD AUTO: 0.02 10*3/MM3 (ref 0–0.05)
IMM GRANULOCYTES NFR BLD AUTO: 0.2 % (ref 0–0.5)
INR PPP: 1.06 (ref 0.9–1.1)
LYMPHOCYTES # BLD AUTO: 1.48 10*3/MM3 (ref 0.7–3.1)
LYMPHOCYTES NFR BLD AUTO: 17.4 % (ref 19.6–45.3)
MCH RBC QN AUTO: 29.6 PG (ref 26.6–33)
MCHC RBC AUTO-ENTMCNC: 33.2 G/DL (ref 31.5–35.7)
MCV RBC AUTO: 89.3 FL (ref 79–97)
MODALITY: ABNORMAL
MONOCYTES # BLD AUTO: 0.57 10*3/MM3 (ref 0.1–0.9)
MONOCYTES NFR BLD AUTO: 6.7 % (ref 5–12)
NEUTROPHILS NFR BLD AUTO: 6.24 10*3/MM3 (ref 1.7–7)
NEUTROPHILS NFR BLD AUTO: 73.2 % (ref 42.7–76)
NRBC BLD AUTO-RTO: 0 /100 WBC (ref 0–0.2)
PLATELET # BLD AUTO: 214 10*3/MM3 (ref 140–450)
PMV BLD AUTO: 10 FL (ref 6–12)
POTASSIUM SERPL-SCNC: 3.7 MMOL/L (ref 3.5–5.2)
PROT SERPL-MCNC: 6.4 G/DL (ref 6–8.5)
PROTHROMBIN TIME: 13.6 SECONDS (ref 11.7–14.2)
RBC # BLD AUTO: 4.66 10*6/MM3 (ref 4.14–5.8)
SODIUM SERPL-SCNC: 143 MMOL/L (ref 136–145)
TROPONIN T SERPL-MCNC: <0.01 NG/ML (ref 0–0.03)
WBC # BLD AUTO: 8.52 10*3/MM3 (ref 3.4–10.8)

## 2021-01-20 PROCEDURE — 99283 EMERGENCY DEPT VISIT LOW MDM: CPT

## 2021-01-20 PROCEDURE — 71045 X-RAY EXAM CHEST 1 VIEW: CPT

## 2021-01-20 PROCEDURE — 80053 COMPREHEN METABOLIC PANEL: CPT | Performed by: EMERGENCY MEDICINE

## 2021-01-20 PROCEDURE — 85025 COMPLETE CBC W/AUTO DIFF WBC: CPT | Performed by: EMERGENCY MEDICINE

## 2021-01-20 PROCEDURE — 85610 PROTHROMBIN TIME: CPT | Performed by: EMERGENCY MEDICINE

## 2021-01-20 PROCEDURE — 93010 ELECTROCARDIOGRAM REPORT: CPT | Performed by: INTERNAL MEDICINE

## 2021-01-20 PROCEDURE — 82375 ASSAY CARBOXYHB QUANT: CPT | Performed by: EMERGENCY MEDICINE

## 2021-01-20 PROCEDURE — 93005 ELECTROCARDIOGRAM TRACING: CPT | Performed by: EMERGENCY MEDICINE

## 2021-01-20 PROCEDURE — 84484 ASSAY OF TROPONIN QUANT: CPT | Performed by: EMERGENCY MEDICINE

## 2021-01-20 PROCEDURE — 93005 ELECTROCARDIOGRAM TRACING: CPT

## 2021-01-21 VITALS
DIASTOLIC BLOOD PRESSURE: 82 MMHG | OXYGEN SATURATION: 99 % | HEIGHT: 69 IN | TEMPERATURE: 98.4 F | RESPIRATION RATE: 18 BRPM | SYSTOLIC BLOOD PRESSURE: 142 MMHG | BODY MASS INDEX: 39.25 KG/M2 | HEART RATE: 60 BPM | WEIGHT: 265 LBS

## 2021-01-21 LAB — QT INTERVAL: 367 MS

## 2021-01-21 NOTE — DISCHARGE INSTRUCTIONS
Follow-up with your employer about today's findings, follow-up with your primary care provider for recheck in the next couple of days, continue current medications, stay well-hydrated, ED return for worsening symptoms as needed.

## 2021-01-21 NOTE — ED TRIAGE NOTES
"Pt to ED from work with c/o possible carbon monoxide poisoning.  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  Pt reports SOA, CP, headache and sore throat.  Pt sent to ED by workmans comp.    Pt wearing mask, staff wearing appropriate PPE.  "

## 2021-01-21 NOTE — ED PROVIDER NOTES
EMERGENCY DEPARTMENT ENCOUNTER    Room Number:  19/19  Date of encounter:  1/21/2021  PCP: Marcos Silva MD  Historian: Patient      HPI:  Chief Complaint: Chest pain, headache  A complete HPI/ROS/PMH/PSH/SH/FH are unobtainable due to: None    Context: Benny Rodríguez is a 60 y.o. male who presents to the ED via private vehicle c/o headache, dizziness, chest pain, shortness of breath, sore throat after being exposed to several hours worth of noxious bus fumes.  Patient is a , got exposed over the course of approximately 2hours while at work today.  Patient states that symptoms have nearly completely resolved at this time other than some very mild burning in his chest when he takes of breath.  Has eaten a cheeseburger in the interim without any significant nausea or vomiting.  Reported feeling fine prior to the exposure.  Was sent here by his employer for evaluation.      MEDICAL RECORD REVIEW    Patient reports taking warfarin for history of stenting for coronary artery disease    PAST MEDICAL HISTORY  Active Ambulatory Problems     Diagnosis Date Noted   • JAI on autoCPAP 06/18/2017   • Cervical spinal stenosis 09/06/2020   • Left sided numbness 09/06/2020   • Hypertension 09/06/2020   • Hyperlipidemia 09/06/2020   • Diabetes mellitus (CMS/[facility]) 09/06/2020   • Coronary artery disease 09/06/2020   • CKD (chronic kidney disease) 09/06/2020   • KENZIE (acute kidney injury) (CMS/[facility]) 09/06/2020   • Headache, migraine 09/07/2020     Resolved Ambulatory Problems     Diagnosis Date Noted   • Suspected COVID-19 virus infection 09/06/2020   • TIA (transient ischemic attack) 09/06/2020     No Additional Past Medical History         PAST SURGICAL HISTORY  Past Surgical History:   Procedure Laterality Date   • CORONARY ANGIOPLASTY WITH STENT PLACEMENT           FAMILY HISTORY  No family history on file.      SOCIAL HISTORY  Social History     Socioeconomic History   • Marital status:      Spouse  name: Not on file   • Number of children: Not on file   • Years of education: Not on file   • Highest education level: Not on file   Tobacco Use   • Smoking status: Never Smoker   • Smokeless tobacco: Never Used   Substance and Sexual Activity   • Alcohol use: No   • Drug use: No   • Sexual activity: Defer         ALLERGIES  Ampicillin        REVIEW OF SYSTEMS  Review of Systems     All systems reviewed and negative except for those discussed in HPI.       PHYSICAL EXAM    I have reviewed the triage vital signs and nursing notes.    ED Triage Vitals [01/20/21 2114]   Temp Heart Rate Resp BP SpO2   97.6 °F (36.4 °C) 89 18 157/100 97 %      Temp src Heart Rate Source Patient Position BP Location FiO2 (%)   Tympanic -- Sitting Right arm --       Physical Exam  General: Awake, alert, no acute distress, nontoxic, nondiaphoretic  HEENT: Mucous membranes moist, atraumatic, EOMI  Neck: Full ROM  Pulm: Symmetric chest rise, nonlabored, lungs CTAB  Cardiovascular: Regular rate and rhythm, intact distal pulses  GI: Soft, nontender, nondistended, no rebound, no guarding, bowel sounds present  MSK: Full ROM, no deformity  Skin: Warm, dry  Neuro: Alert and oriented x 3, GCS 15, moving all extremities, no focal deficits  Psych: Calm, cooperative      Surgical mask, protective eye goggles, and gloves used during this encounter. Patient in surgical mask.      LAB RESULTS  Recent Results (from the past 24 hour(s))   ECG 12 Lead    Collection Time: 01/20/21  9:20 PM   Result Value Ref Range    QT Interval 367 ms   Comprehensive Metabolic Panel    Collection Time: 01/20/21 10:02 PM    Specimen: Blood   Result Value Ref Range    Glucose 214 (H) 65 - 99 mg/dL    BUN 14 8 - 23 mg/dL    Creatinine 1.24 0.76 - 1.27 mg/dL    Sodium 143 136 - 145 mmol/L    Potassium 3.7 3.5 - 5.2 mmol/L    Chloride 110 (H) 98 - 107 mmol/L    CO2 22.3 22.0 - 29.0 mmol/L    Calcium 9.2 8.6 - 10.5 mg/dL    Total Protein 6.4 6.0 - 8.5 g/dL    Albumin 4.20 3.50 -  5.20 g/dL    ALT (SGPT) 23 1 - 41 U/L    AST (SGOT) 17 1 - 40 U/L    Alkaline Phosphatase 54 39 - 117 U/L    Total Bilirubin 0.3 0.0 - 1.2 mg/dL    eGFR Non African Amer 59 (L) >60 mL/min/1.73    Globulin 2.2 gm/dL    A/G Ratio 1.9 g/dL    BUN/Creatinine Ratio 11.3 7.0 - 25.0    Anion Gap 10.7 5.0 - 15.0 mmol/L   Troponin    Collection Time: 01/20/21 10:02 PM    Specimen: Blood   Result Value Ref Range    Troponin T <0.010 0.000 - 0.030 ng/mL   CBC Auto Differential    Collection Time: 01/20/21 10:02 PM    Specimen: Blood   Result Value Ref Range    WBC 8.52 3.40 - 10.80 10*3/mm3    RBC 4.66 4.14 - 5.80 10*6/mm3    Hemoglobin 13.8 13.0 - 17.7 g/dL    Hematocrit 41.6 37.5 - 51.0 %    MCV 89.3 79.0 - 97.0 fL    MCH 29.6 26.6 - 33.0 pg    MCHC 33.2 31.5 - 35.7 g/dL    RDW 13.2 12.3 - 15.4 %    RDW-SD 42.9 37.0 - 54.0 fl    MPV 10.0 6.0 - 12.0 fL    Platelets 214 140 - 450 10*3/mm3    Neutrophil % 73.2 42.7 - 76.0 %    Lymphocyte % 17.4 (L) 19.6 - 45.3 %    Monocyte % 6.7 5.0 - 12.0 %    Eosinophil % 2.0 0.3 - 6.2 %    Basophil % 0.5 0.0 - 1.5 %    Immature Grans % 0.2 0.0 - 0.5 %    Neutrophils, Absolute 6.24 1.70 - 7.00 10*3/mm3    Lymphocytes, Absolute 1.48 0.70 - 3.10 10*3/mm3    Monocytes, Absolute 0.57 0.10 - 0.90 10*3/mm3    Eosinophils, Absolute 0.17 0.00 - 0.40 10*3/mm3    Basophils, Absolute 0.04 0.00 - 0.20 10*3/mm3    Immature Grans, Absolute 0.02 0.00 - 0.05 10*3/mm3    nRBC 0.0 0.0 - 0.2 /100 WBC   Protime-INR    Collection Time: 01/20/21 10:02 PM    Specimen: Blood   Result Value Ref Range    Protime 13.6 11.7 - 14.2 Seconds    INR 1.06 0.90 - 1.10   Carboxyhemoglobin    Collection Time: 01/20/21 10:09 PM    Specimen: Blood   Result Value Ref Range    Carboxyhemoglobin 5.2 (H) 0 - 1.5 %    Site N/A     Modality Room Air        Ordered the above labs and independently reviewed the results.        RADIOLOGY  Xr Chest 1 View    Result Date: 1/20/2021  Patient: JENNIFER FERGUSON  Time Out: 22:19 Exam(s): FILM  CXR 1 VIEW EXAM:   XR Chest, 1 View CLINICAL HISTORY: CHEST PAIN TECHNIQUE:   Frontal view of the chest. COMPARISON:   No relevant prior studies available. FINDINGS:   Lungs:  Unremarkable.  No consolidation.   Pleural space:  Unremarkable.  No pneumothorax.   Heart:  Unremarkable.  No cardiomegaly.   Mediastinum:  Unremarkable.   Bones joints:  Unremarkable. IMPRESSION:       Normal chest x-ray.     Electronically signed by Dain Motley M.D. on 01-20-21 at 2219      I ordered the above noted radiological studies. Reviewed by me.  See dictation for official radiology interpretation.      PROCEDURES    Procedures  EKG    EKG Time: 2120  Rhythm/Rate: Sinus rhythm with rate of 77  Normal axis  Normal intervals  Low voltage in the precordial leads, no acute ischemic changes  No STEMI     Interpreted Contemporaneously by me, independently viewed  No emergent changes compared to September 2020      MEDICATIONS GIVEN IN ER    Medications - No data to display      PROGRESS, DATA ANALYSIS, CONSULTS, AND MEDICAL DECISION MAKING    All labs have been independently reviewed by me.  All radiology studies have been reviewed by me and discussed with radiologist dictating the report.   EKG's independently viewed and interpreted by me.  Discussion below represents my analysis of pertinent findings related to patient's condition, differential diagnosis, treatment plan and final disposition.    Concern for possible carbon monoxide exposure, will check a carboxyhemoglobin and other labs including cardiac enzymes and EKG but overall his symptoms are improving and nearly completely resolved.    ED Course as of Jan 21 0316   Wed Jan 20, 2021   2314 Discussed with patient has slightly elevated carboxyhemoglobin level, symptoms have nearly resolved, discussed with him that I think is probably fine to be discharged at this point but I did offer him supplemental oxygen treatment for about an hour or so.  He agreed to move forward with oxygen  so we will give him 100 and nonrebreather for 1 hour, with plans for discharge.    [DC]      ED Course User Index  [DC] Milind Terry MD       AS OF 03:16 EST VITALS:    BP - 142/82  HR - 60  TEMP - 98.4 °F (36.9 °C) (Oral)  02 SATS - 99%        DIAGNOSIS  Final diagnoses:   Carbon monoxide exposure         DISPOSITION  DISCHARGE    Patient discharged in stable condition.    Reviewed implications of results, diagnosis, meds, responsibility to follow up, warning signs and symptoms of possible worsening, potential complications and reasons to return to ER.    Patient/Family voiced understanding of above instructions.    Discussed plan for discharge, as there is no emergent indication for admission. Patient referred to primary care provider for BP management due to today's BP. Pt/family is agreeable and understands need for follow up and repeat testing.  Pt is aware that discharge does not mean that nothing is wrong but it indicates no emergency is present that requires admission and they must continue care with follow-up as given below or physician of their choice.     FOLLOW-UP  [facility] Emergency Department  [address] [facility] [address] [phone]    As needed, If symptoms worsen    Marcos Silva MD  [address] [facility] [place] [address] [phone]    Schedule an appointment as soon as possible for a visit   for recheck as needed         Medication List      No changes were made to your prescriptions during this visit.                    Milind Terry MD  01/21/21 3827

## 2021-03-16 ENCOUNTER — BULK ORDERING (OUTPATIENT)
Dept: CASE MANAGEMENT | Facility: OTHER | Age: 61
End: 2021-03-16

## 2021-03-16 DIAGNOSIS — Z23 IMMUNIZATION DUE: ICD-10-CM

## 2021-03-19 ENCOUNTER — IMMUNIZATION (OUTPATIENT)
Dept: VACCINE CLINIC | Facility: HOSPITAL | Age: 61
End: 2021-03-19

## 2021-03-19 PROCEDURE — 0001A: CPT | Performed by: INTERNAL MEDICINE

## 2021-03-19 PROCEDURE — 91300 HC SARSCOV02 VAC 30MCG/0.3ML IM: CPT | Performed by: INTERNAL MEDICINE

## 2021-04-09 ENCOUNTER — IMMUNIZATION (OUTPATIENT)
Dept: VACCINE CLINIC | Facility: HOSPITAL | Age: 61
End: 2021-04-09

## 2021-04-09 PROCEDURE — 0002A: CPT | Performed by: INTERNAL MEDICINE

## 2021-04-09 PROCEDURE — 91300 HC SARSCOV02 VAC 30MCG/0.3ML IM: CPT | Performed by: INTERNAL MEDICINE

## 2021-05-26 ENCOUNTER — APPOINTMENT (OUTPATIENT)
Dept: SLEEP MEDICINE | Facility: HOSPITAL | Age: 61
End: 2021-05-26

## 2021-09-14 NOTE — ED PROVIDER NOTES
EMERGENCY DEPARTMENT ENCOUNTER    CHIEF COMPLAINT  Chief Complaint: constipation  History given by: pt  History limited by: nothing  Room Number: 49/49  PMD: Marcos Silva MD      HPI:  Pt is a 58 y.o. male who presents complaining of constipation onset 3 days ago. He has never had this problem before, and denies any tx prior to arrival. It has led to severe abd bloating. He has not vomited and denies nausea. Nothing worsens or alleviates the pain. He denies smoking and admits to occasional alcohol. He had a Abd Xray completed at [facility] and was sent here for further evaluation and tx.    Duration/Onset/Timin days  Location: abd  Radiation: none  Quality: diffuse  Intensity/Severity: moderate  Associated Symptoms: abd bloating/pain, lower back pain, headache  Aggravating or Alleviating Factors: none  Previous Episodes: No previous episodes.      PAST MEDICAL HISTORY  Active Ambulatory Problems     Diagnosis Date Noted   • JAI on autoCPAP 2017     Resolved Ambulatory Problems     Diagnosis Date Noted   • No Resolved Ambulatory Problems     Past Medical History:   Diagnosis Date   • Hyperlipidemia    • Hypertension        PAST SURGICAL HISTORY  Past Surgical History:   Procedure Laterality Date   • CORONARY ANGIOPLASTY WITH STENT PLACEMENT         FAMILY HISTORY  History reviewed. No pertinent family history.    SOCIAL HISTORY  Social History     Social History   • Marital status:      Spouse name: N/A   • Number of children: N/A   • Years of education: N/A     Occupational History   • Not on file.     Social History Main Topics   • Smoking status: Never Smoker   • Smokeless tobacco: Not on file   • Alcohol use No   • Drug use: No   • Sexual activity: Not on file     Other Topics Concern   • Not on file     Social History Narrative   • No narrative on file       ALLERGIES  Ampicillin    REVIEW OF SYSTEMS  Review of Systems   Constitutional: Negative.  Negative for chills and  Please see my note from today - reduced his cozaar due to low BP and dizziness. Will send labs your way.  Edwar fever.   HENT: Negative for sore throat.    Eyes: Negative.    Respiratory: Negative.  Negative for cough.    Cardiovascular: Negative.  Negative for chest pain.   Gastrointestinal: Positive for abdominal distention and abdominal pain. Negative for vomiting.   Genitourinary: Negative.  Negative for dysuria.   Musculoskeletal: Positive for back pain (mild).   Skin: Negative.  Negative for rash.   Neurological: Positive for headaches (mild).       PHYSICAL EXAM  ED Triage Vitals   Temp Heart Rate Resp BP SpO2   04/09/18 1641 04/09/18 1641 04/09/18 1724 04/09/18 1724 04/09/18 1641   97.7 °F (36.5 °C) 56 16 128/87 96 %      Temp src Heart Rate Source Patient Position BP Location FiO2 (%)   04/09/18 1641 -- -- -- --   Oral           Physical Exam   Constitutional: No distress.   HENT:   Head: Normocephalic and atraumatic.   Mouth/Throat: Oropharynx is clear and moist.   Eyes:   Unremarkable   Cardiovascular: Normal rate and regular rhythm.    Pulmonary/Chest: Breath sounds normal. No respiratory distress.   Abdominal: There is tenderness (diffuse).   Musculoskeletal: He exhibits no edema or tenderness.   Neurological: He is alert.   Skin: No rash noted.   Nursing note and vitals reviewed.      LAB RESULTS  Lab Results (last 24 hours)     Procedure Component Value Units Date/Time    CBC & Differential [[id number]] Collected:  04/09/18 1743    Specimen:  Blood Updated:  04/09/18 1826    Narrative:       The following orders were created for panel order CBC & Differential.  Procedure                               Abnormality         Status                     ---------                               -----------         ------                     CBC Auto Differential[[id number]]         Normal              Final result                 Please view results for these tests on the individual orders.    Comprehensive Metabolic Panel [[id number]]  (Abnormal) Collected:  04/09/18 1743    Specimen:  Blood Updated:  04/09/18 1840      Glucose 110 (H) mg/dL      BUN 17 mg/dL      Creatinine 1.34 (H) mg/dL      Sodium 145 mmol/L      Potassium 4.3 mmol/L      Chloride 107 mmol/L      CO2 25.3 mmol/L      Calcium 10.0 mg/dL      Total Protein 7.3 g/dL      Albumin 4.40 g/dL      ALT (SGPT) 24 U/L      AST (SGOT) 23 U/L      Alkaline Phosphatase 48 U/L      Total Bilirubin 0.6 mg/dL      eGFR Non African Amer 55 (L) mL/min/1.73      Globulin 2.9 gm/dL      A/G Ratio 1.5 g/dL      BUN/Creatinine Ratio 12.7     Anion Gap 12.7 mmol/L     Lipase [[id number]]  (Normal) Collected:  04/09/18 1743    Specimen:  Blood Updated:  04/09/18 1847     Lipase 34 U/L     CBC Auto Differential [[id number]]  (Normal) Collected:  04/09/18 1743    Specimen:  Blood Updated:  04/09/18 1826     WBC 6.91 10*3/mm3      RBC 4.75 10*6/mm3      Hemoglobin 14.6 g/dL      Hematocrit 43.9 %      MCV 92.4 fL      MCH 30.7 pg      MCHC 33.3 g/dL      RDW 13.2 %      RDW-SD 44.6 fl      MPV 10.8 fL      Platelets 204 10*3/mm3      Neutrophil % 64.2 %      Lymphocyte % 22.4 %      Monocyte % 10.6 %      Eosinophil % 2.5 %      Basophil % 0.3 %      Immature Grans % 0.0 %      Neutrophils, Absolute 4.44 10*3/mm3      Lymphocytes, Absolute 1.55 10*3/mm3      Monocytes, Absolute 0.73 10*3/mm3      Eosinophils, Absolute 0.17 10*3/mm3      Basophils, Absolute 0.02 10*3/mm3      Immature Grans, Absolute 0.00 10*3/mm3     Urinalysis With / Culture If Indicated - Urine, Clean Catch [[id number]]  (Abnormal) Collected:  04/09/18 1927    Specimen:  Urine from Urine, Clean Catch Updated:  04/09/18 1957     Color, UA Yellow     Appearance, UA Clear     pH, UA 5.5     Specific Gravity, UA 1.029     Glucose, UA Negative     Ketones, UA Trace (A)     Bilirubin, UA Negative     Blood, UA Trace (A)     Protein, UA Negative     Leuk Esterase, UA Negative     Nitrite, UA Negative     Urobilinogen, UA 0.2 E.U./dL    Urinalysis, Microscopic Only - Urine, Clean Catch [[id number]] Collected:  04/09/18 1927     Specimen:  Urine from Urine, Clean Catch Updated:  04/09/18 1957     RBC, UA 0-2 /HPF      WBC, UA 0-2 /HPF      Bacteria, UA None Seen /HPF      Squamous Epithelial Cells, UA 0-2 /HPF      Hyaline Casts, UA 0-2 /LPF      Methodology Automated Microscopy          I ordered the above labs and reviewed the results    RADIOLOGY  No orders to display      Outside XR Abd/Pelvis- No acute bowel obstruction.    Interpreted and independently reviewed by me. Reviewed by me in PACS.       PROCEDURES  Procedures      PROGRESS AND CONSULTS  ED Course     2143- Initial pt evaluation. I endorsed that pt labs returned generally normal with no acute abnormalities. Pt had an abd Xr completed recently, I will review the results. Will f/u after they are read.    1027- Pt recheck. I advised the pt that after reviewing his radiology, he has no acute bowel obstruction. Thus, I believe that the best way for him to resolve this issue is by taking a stool softener/laxative. I suggested Miralax as an option. If this does not work, pt may consider an enema. I will provide pt with f/u instructions. Pt understands and agrees with the plan, all questions answered.    MEDICAL DECISION MAKING  Results were reviewed/discussed with the patient and they were also made aware of online access. Pt also made aware that some labs, such as cultures, will not be resulted during ER visit and follow up with PMD is necessary.     MDM  Number of Diagnoses or Management Options     Amount and/or Complexity of Data Reviewed  Clinical lab tests: reviewed (Creatinine- 1.34, Lipase- 34, Urinalysis- negative)  Tests in the radiology section of CPT®: reviewed (XR Abd/Pelvis- No bowel obstruction)  Decide to obtain previous medical records or to obtain history from someone other than the patient: yes  Review and summarize past medical records: yes (Reviewed pt outside radiology showing no bowel obstruction.)           DIAGNOSIS  Final diagnoses:   Constipation,  unspecified constipation type       DISPOSITION  DISCHARGE    Patient discharged in stable condition.    Reviewed implications of results, diagnosis, meds, responsibility to follow up, warning signs and symptoms of possible worsening, potential complications and reasons to return to ER, including severe worsening of sx.    Patient/Family voiced understanding of above instructions.    Discussed plan for discharge, as there is no emergent indication for admission. Patient referred to primary care provider for BP management due to today's BP. Pt/family is agreeable and understands need for follow up and repeat testing.  Pt is aware that discharge does not mean that nothing is wrong but it indicates no emergency is present that requires admission and they must continue care with follow-up as given below or physician of their choice.     FOLLOW-UP  Marcos Silva MD  [address] [facility] [address] [phone]    In 3 days  If Not Better         Medication List      No changes were made to your prescriptions during this visit.           Latest Documented Vital Signs:  As of 10:31 PM  BP- 128/87 HR- 56 Temp- 97.7 °F (36.5 °C) (Oral) O2 sat- 96%    --  Documentation assistance provided by pina Rojas for Dr. Anderson.  Information recorded by the scribsreekanth was done at my direction and has been verified and validated by me.     Larry Rojas  04/09/18 2234       Farrukh Anderson MD  04/09/18 2233

## 2022-03-25 ENCOUNTER — OFFICE VISIT (OUTPATIENT)
Dept: SLEEP MEDICINE | Facility: HOSPITAL | Age: 62
End: 2022-03-25

## 2022-03-25 VITALS
BODY MASS INDEX: 36.43 KG/M2 | HEIGHT: 69 IN | OXYGEN SATURATION: 97 % | WEIGHT: 246 LBS | SYSTOLIC BLOOD PRESSURE: 132 MMHG | HEART RATE: 64 BPM | DIASTOLIC BLOOD PRESSURE: 70 MMHG

## 2022-03-25 DIAGNOSIS — G47.33 OSA (OBSTRUCTIVE SLEEP APNEA): Primary | ICD-10-CM

## 2022-03-25 DIAGNOSIS — E66.9 OBESITY (BMI 30-39.9): ICD-10-CM

## 2022-03-25 DIAGNOSIS — Z02.4 ENCOUNTER FOR CDL (COMMERCIAL DRIVING LICENSE) EXAM: ICD-10-CM

## 2022-03-25 PROCEDURE — G0463 HOSPITAL OUTPT CLINIC VISIT: HCPCS

## 2022-03-25 NOTE — PROGRESS NOTES
[facility]  Telephone: [phone] / Fax: [phone] [place]  Telephone: [phone] / Fax: [phone] Chiquis Navarro    PCP: Marcos Silva MD    Reason for visit: JAI f/u    Benny Rodríguez is a 62 y.o.male  was last seen at [facility] sleep lab by Dr Torres in 2017. Based on this office note, he has been on auto CPAP 13-20cm H2O. 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 He uses a 10 year old machine. He took the card to [employer] in order to get it downloaded. [employer] accidentally erased the data from his card. They gave him a new card. He used the machine with a new card for past few days. He did not bring his card or his machine today. DOT medical examiner reviewed his last year's data.  However, since there was no data to review this year, they gave him a 30 day temporary card until he can get an updated download or a new machine. I have no data to review today to issue a DOT statement. I do not have a copy of his old study on file either. His machine is a 10 year old Jia device that requires replacement.  His sleep schedule is 2AM-10PM. His ESS is 10. He denies EDS while driving. He gets enough sleep in general.    SH no tobacco, no alcohol, no caffeine    ROS +nasasl congestion,    DME [employer].    Current Medications:    Current Outpatient Medications:   •  ascorbic acid (VITAMIN C) 1000 MG tablet, Take 1,000 mg by mouth Daily., Disp: , Rfl:   •  aspirin 81 MG chewable tablet, Chew 81 mg Daily., Disp: , Rfl:   •  atorvastatin (LIPITOR) 20 MG tablet, Take 20 mg by mouth Daily., Disp: , Rfl:   •  glimepiride (AMARYL) 4 MG tablet, Take 4 mg by mouth Every Morning Before Breakfast., Disp: , Rfl:   •  lisinopril (PRINIVIL,ZESTRIL) 10 MG tablet, Take 10 mg by mouth Daily., Disp: , Rfl:   •  Omega-3 Fatty Acids (FISH OIL ULTRA) 1400 MG capsule, Take 1,400 mg by mouth Daily., Disp: , Rfl:   •  rOPINIRole (REQUIP) 1 MG tablet, Take 1 mg by mouth Every Night. Take 1 hour  "before bedtime., Disp: , Rfl:   •  vitamin B-12 (CYANOCOBALAMIN) 1000 MCG tablet, Take 1,000 mcg by mouth Daily., Disp: , Rfl:    also entered in Sleep Questionnaire    Patient  has a past medical history of Coronary artery disease, Diabetes mellitus (CMS/HCC), Hyperlipidemia, and Hypertension.    I have reviewed the Past Medical History, Past Surgical History, Social History and Family History.    Vital Signs /70   Pulse 64   Ht 175.3 cm (69\")   Wt 112 kg (246 lb)   SpO2 97%   BMI 36.33 kg/m²  Body mass index is 36.33 kg/m².    General Alert and oriented. No acute distress noted   Pharynx/Throat Class III  Mallampati airway, large tongue, no evidence of redundant lateral pharyngeal tissue. No oral lesions. No thrush. Moist mucous membranes.   Head Normocephalic. Symmetrical. Atraumatic.    Nose No septal deviation. No drainage   Chest Wall Normal shape. Symmetric expansion with respiration. No tenderness.   Neck Trachea midline, no thyromegaly or adenopathy    Lungs Clear to auscultation bilaterally. No wheezes. No rhonchi. No rales. Respirations regular, even and unlabored.   Heart Regular rhythm and normal rate. Normal S1 and S2. No murmur   Abdomen Soft, non-tender and non-distended. Normal bowel sounds. No masses.   Extremities Moves all extremities well. No edema   Psychiatric Normal mood and affect.     Testing:  Download n/a    Study-n/a    Impression:  1. JAI (obstructive sleep apnea)    2. Encounter for CDL (commercial driving license) exam    3. Obesity (BMI 30-39.9)          Plan:  Patient was asked to bring his CPAP machine here on Monday morning to review the data from the card or from the machine. I will then give him a note to take to DOT medical expaminer outlining his recent CPAP usage and JAI control. I ordered a new sleep study-HST- to get him qualified for a new machine through insurance.   He uses a 10 year old recalled Jia unit. His current CDL will  22. Weight loss " will be strongly beneficial to reduce severity of sleep disorder breathing.        Thank you for allowing me to participate in your patient's care.      JUMA Huntley  [facility]  Phone: [phone]      Part of this note may be an electronic transcription/translation of spoken language to printed text using the Dragon Dictation System.

## 2022-03-28 ENCOUNTER — OFFICE VISIT (OUTPATIENT)
Dept: SLEEP MEDICINE | Facility: HOSPITAL | Age: 62
End: 2022-03-28

## 2022-03-28 VITALS
HEIGHT: 69 IN | BODY MASS INDEX: 36.58 KG/M2 | HEART RATE: 64 BPM | DIASTOLIC BLOOD PRESSURE: 70 MMHG | SYSTOLIC BLOOD PRESSURE: 128 MMHG | WEIGHT: 247 LBS | OXYGEN SATURATION: 97 %

## 2022-03-28 DIAGNOSIS — G47.33 OBSTRUCTIVE SLEEP APNEA, ADULT: Primary | ICD-10-CM

## 2022-03-28 DIAGNOSIS — Z02.4 ENCOUNTER FOR CDL (COMMERCIAL DRIVING LICENSE) EXAM: ICD-10-CM

## 2022-03-28 NOTE — PROGRESS NOTES
Patient brought his machine and card today for my review. We downloaded his data. Over the past year, machine shows use on 6/350 days with average nightly use on the days used is 6 hours and 3 minutes. Machine is set at 13-20cm H2O. P90 is 15.2cm H2O, residual AHI is 4.3, leak of 40 min per day. His machine is almost 8 years old based on the download I am reviewing. It does not record data properly. It requires replacement. He is scheduled for updated sleep study on 4/28/22. After the study, we will order him a new machine that will be able to correctly report his usage and record data. He denies sleepiness while driving. His temporary CDL expires 4/23/22. It may need to be slightly extended to allow time for retesting and set up with a new machine. I have written a note to his CDL medical examiner to consider the above circumstances and act appropriately.     He will f/u with us at the sleep center after testing/set up with new machine.

## 2022-04-28 ENCOUNTER — HOSPITAL ENCOUNTER (OUTPATIENT)
Dept: SLEEP MEDICINE | Facility: HOSPITAL | Age: 62
Discharge: HOME | End: 2022-04-28
Admitting: NURSE PRACTITIONER

## 2022-04-28 DIAGNOSIS — G47.33 OSA (OBSTRUCTIVE SLEEP APNEA): ICD-10-CM

## 2022-04-28 PROCEDURE — 95806 SLEEP STUDY UNATT&RESP EFFT: CPT

## 2022-05-18 ENCOUNTER — TELEPHONE (OUTPATIENT)
Dept: SLEEP MEDICINE | Facility: HOSPITAL | Age: 62
End: 2022-05-18

## 2022-06-27 ENCOUNTER — TELEPHONE (OUTPATIENT)
Dept: SLEEP MEDICINE | Facility: HOSPITAL | Age: 62
End: 2022-06-27

## 2022-08-03 ENCOUNTER — APPOINTMENT (OUTPATIENT)
Dept: GENERAL RADIOLOGY | Facility: HOSPITAL | Age: 62
End: 2022-08-03

## 2022-08-03 ENCOUNTER — HOSPITAL ENCOUNTER (EMERGENCY)
Facility: HOSPITAL | Age: 62
Discharge: HOME | End: 2022-08-03
Attending: EMERGENCY MEDICINE | Admitting: EMERGENCY MEDICINE

## 2022-08-03 VITALS
RESPIRATION RATE: 18 BRPM | HEART RATE: 51 BPM | DIASTOLIC BLOOD PRESSURE: 75 MMHG | SYSTOLIC BLOOD PRESSURE: 140 MMHG | TEMPERATURE: 97.4 F | OXYGEN SATURATION: 96 %

## 2022-08-03 DIAGNOSIS — M25.521 RIGHT ELBOW PAIN: Primary | ICD-10-CM

## 2022-08-03 DIAGNOSIS — S52.121A CLOSED DISPLACED FRACTURE OF HEAD OF RIGHT RADIUS, INITIAL ENCOUNTER: ICD-10-CM

## 2022-08-03 PROCEDURE — 99283 EMERGENCY DEPT VISIT LOW MDM: CPT

## 2022-08-03 PROCEDURE — 73070 X-RAY EXAM OF ELBOW: CPT

## 2022-08-03 RX ORDER — HYDROCODONE BITARTRATE AND ACETAMINOPHEN 7.5; 325 MG/1; MG/1
1 TABLET ORAL EVERY 6 HOURS PRN
Qty: 12 TABLET | Refills: 0 | Status: SHIPPED | OUTPATIENT
Start: 2022-08-03 | End: 2022-08-11 | Stop reason: HOSPADM

## 2022-08-03 NOTE — ED PROVIDER NOTES
MD ATTESTATION NOTE    The [facility] and I have discussed this patient's history, physical exam, and treatment plan.    I provided a substantive portion of the care of this patient. I personally performed the physical exam, in its entirety. The attached note describes my personal findings.      Jennifer Rodríguez is a 62 y.o. 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  Patient had pain and has been unable to straighten his arm at the elbow since.  Patient denies any significant trauma.  Denies any prior history of injury to this area.      On exam:  GENERAL: not distressed  HENT: nares patent  EYES: no scleral icterus  CV: regular rhythm, regular rate  RESPIRATORY: normal effort  ABDOMEN: soft  MUSCULOSKELETAL: no deformity, decreased range of motion a to right elbow unable to fully extend the elbow neurovascular intact distally  NEURO: alert, moves all extremities, follows commands  SKIN: warm, dry    Labs  No results found for this or any previous visit (from the past 24 hour(s)).    Radiology  XR Elbow 2 View Right    Result Date: 8/3/2022  Patient: JENNIFER RODRÍGUEZ  Time Out: 04:13 Exam(s): FILM RIGHT ELBOW EXAM:   XR Right Elbow Complete, 3 or More Views CLINICAL HISTORY:    Reason for exam: elbow pain after opening door. TECHNIQUE:   Frontal, lateral and oblique views of the right elbow. COMPARISON:   No relevant prior studies available. FINDINGS:   Bones joints:  Anterior dislocation of the radial head.  Chronic appearing fracture deformity of the radial head and moderate degenerative changes at the elbow.  No acute fracture.   Soft tissues:  Unremarkable. IMPRESSION:     1.  Anterior dislocation of the radial head.  This is age indeterminate. 2.  Chronic appearing fracture deformity of the radial head and moderate degenerative changes at the elbow.     Electronically signed by River Terry MD on 08-03-22 at 0411      Medical Decision Making:  ED Course as  of 08/03/22 0624   Wed Aug 03, 2022   0336 Patient rechecked, discussed radiology results, diagnosis, and need for follow-up with orthopedics.  Patient expressed understanding and agrees with plan. [PIERRE]      ED Course User Index  [PIERRE] Johan Ruiz PA           PPE: Both the patient and I wore a surgical mask throughout the entire patient encounter. I wore protective goggles.     Diagnosis  Final diagnoses:   Right elbow pain   Closed displaced fracture of head of right radius, initial encounter        Marcos Herring MD  08/03/22 0625

## 2022-08-03 NOTE — DISCHARGE INSTRUCTIONS
Wear splint, apply ice and elevate, pain medicine as needed, call first thing in the morning to schedule follow up with the orthopedist.

## 2022-08-03 NOTE — ED PROVIDER NOTES
EMERGENCY DEPARTMENT ENCOUNTER    Room Number:  05/05  Date of encounter:  8/3/2022  PCP: Marcos Silva MD  Historian: Patient       HPI:  Chief Complaint: Elbow pain  A complete HPI/ROS/PMH/PSH/SH/FH are unobtainable due to: N/A    Context: Benny Rodríguez is a 62 y.o. LHD male with past medical history of CAD s/p stenting x2, T2DM, HTN, and HLD with prior elbow fracture when he was 4 years old who presents to the ED c/o right elbow injury.  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  Patient denies any fall or trauma, no numbness or tingling, or any other injuries.      PAST MEDICAL HISTORY  Active Ambulatory Problems     Diagnosis Date Noted   • JAI on autoCPAP 06/18/2017   • Cervical spinal stenosis 09/06/2020   • Left sided numbness 09/06/2020   • Hypertension 09/06/2020   • Hyperlipidemia 09/06/2020   • Diabetes mellitus (HCC) 09/06/2020   • Coronary artery disease 09/06/2020   • CKD (chronic kidney disease) 09/06/2020   • KENZIE (acute kidney injury) (HCC) 09/06/2020   • Headache, migraine 09/07/2020     Resolved Ambulatory Problems     Diagnosis Date Noted   • Suspected COVID-19 virus infection 09/06/2020   • TIA (transient ischemic attack) 09/06/2020     No Additional Past Medical History         PAST SURGICAL HISTORY  Past Surgical History:   Procedure Laterality Date   • CORONARY ANGIOPLASTY WITH STENT PLACEMENT           FAMILY HISTORY  No family history on file.      SOCIAL HISTORY  Social History     Socioeconomic History   • Marital status:    Tobacco Use   • Smoking status: Never Smoker   • Smokeless tobacco: Never Used   Substance and Sexual Activity   • Alcohol use: No   • Drug use: No   • Sexual activity: Defer         ALLERGIES  Ampicillin        REVIEW OF SYSTEMS  Review of Systems     All  systems reviewed and negative except for those discussed in HPI.       PHYSICAL EXAM    I have reviewed the triage vital signs and nursing notes.    ED Triage Vitals [08/03/22 0050]   Temp Heart Rate Resp BP SpO2   97.4 °F (36.3 °C) 64 18 143/86 98 %      Temp src Heart Rate Source Patient Position BP Location FiO2 (%)   Oral Monitor Sitting Right arm --       Physical Exam  GENERAL: Alert and oriented x3, not distressed  HENT: nares patent  EYES: no scleral icterus  CV: regular rhythm, regular rate intact radial pulse in the right upper extremity  RESPIRATORY: normal effort  MUSCULOSKELETAL: no deformity, decreased ROM secondary to pain, patient is unable to fully extend at the elbow and has significant pain with supination and pronation  NEURO: alert, moves all extremities, normal sensation, follows commands  SKIN: warm, dry        LAB RESULTS  No results found for this or any previous visit (from the past 24 hour(s)).    Ordered the above labs and independently reviewed the results.        RADIOLOGY  XR Elbow 2 View Right    Result Date: 8/3/2022  Patient: JENNIFER FERGUSON  Time Out: 04:13 Exam(s): FILM RIGHT ELBOW EXAM:   XR Right Elbow Complete, 3 or More Views CLINICAL HISTORY:    Reason for exam: elbow pain after opening door. TECHNIQUE:   Frontal, lateral and oblique views of the right elbow. COMPARISON:   No relevant prior studies available. FINDINGS:   Bones joints:  Anterior dislocation of the radial head.  Chronic appearing fracture deformity of the radial head and moderate degenerative changes at the elbow.  No acute fracture.   Soft tissues:  Unremarkable. IMPRESSION:     1.  Anterior dislocation of the radial head.  This is age indeterminate. 2.  Chronic appearing fracture deformity of the radial head and moderate degenerative changes at the elbow.     Electronically signed by River Terry MD on 08-03-22 at 0413      I ordered the above noted radiological studies. Reviewed by me and discussed with  radiologist.  See dictation for official radiology interpretation.      PROCEDURES    Splint - Cast - Strapping    Date/Time: 8/3/2022 5:54 AM  Performed by: Johan Ruiz PA  Authorized by: Marcos Herring MD     Consent:     Consent obtained:  Verbal    Consent given by:  Patient    Risks discussed:  Pain    Alternatives discussed:  No treatment  Universal protocol:     Imaging studies available: yes      Patient identity confirmed:  Verbally with patient  Pre-procedure details:     Distal neurologic exam:  Normal    Distal perfusion: distal pulses strong    Procedure details:     Location:  Elbow    Elbow location:  R elbow    Splint type:  Sugar tong    Supplies:  Cotton padding, elastic bandage and fiberglass  Post-procedure details:     Distal neurologic exam:  Normal    Distal perfusion: distal pulses strong      Procedure completion:  Tolerated well, no immediate complications    Post-procedure imaging: not applicable            MEDICATIONS GIVEN IN ER    Medications - No data to display      PROGRESS, DATA ANALYSIS, CONSULTS, AND MEDICAL DECISION MAKING    All labs have been independently reviewed by me.  All radiology studies have been reviewed by me and discussed with radiologist dictating the report.   EKG's independently viewed and interpreted by me.  Discussion below represents my analysis of pertinent findings related to patient's condition, differential diagnosis, treatment plan and final disposition.    DDx: Includes but not limited to right elbow sprain, right elbow fracture    ED Course as of 08/03/22 0558   Wed Aug 03, 2022   0336 Patient rechecked, discussed radiology results, diagnosis, and need for follow-up with orthopedics.  Patient expressed understanding and agrees with plan. [PIERRE]      ED Course User Index  [PIERRE] Johan Ruiz PA       MDM: Patient's x-ray shows what appears to be an old injury to the radial head that is possibly become dislodged and/or dislocated as the  patient is now unable to fully extend the arm at the elbow.  Patient has been placed in a sugar-tong splint and sling, given appropriate orthopedic follow-up, and pain medications as needed.  Vital signs are stable, patient is safe for discharge home.    PPE: The patient wore a surgical mask throughout the entire patient encounter. I wore an N95.    AS OF 05:58 EDT VITALS:    BP - 140/75  HR - 51  TEMP - 97.4 °F (36.3 °C) (Oral)  O2 SATS - 96%        DIAGNOSIS  Final diagnoses:   Right elbow pain   Closed displaced fracture of head of right radius, initial encounter         DISPOSITION  DISCHARGE    Patient discharged in stable condition.    Reviewed implications of results, diagnosis, meds, responsibility to follow up, warning signs and symptoms of possible worsening, potential complications and reasons to return to ER.    Patient/Family voiced understanding of above instructions.    Discussed plan for discharge, as there is no emergent indication for admission. Patient referred to primary care provider for BP management due to today's BP. Pt/family is agreeable and understands need for follow up and repeat testing.  Pt is aware that discharge does not mean that nothing is wrong but it indicates no emergency is present that requires admission and they must continue care with follow-up as given below or physician of their choice.     FOLLOW-UP  David Winston MD  [address] [id number] [phone]    Schedule an appointment as soon as possible for a visit            Medication List      New Prescriptions    HYDROcodone-acetaminophen 7.5-325 MG per tablet  Commonly known as: NORCO  Take 1 tablet by mouth Every 6 (Six) Hours As Needed for Moderate Pain .           Where to Get Your Medications      These medications were sent to [id number] - [place], [place] - [address] - [phone]  - [phone] FX  [address], [facility] [address]    Phone:  [phone]   · HYDROcodone-acetaminophen 7.5-325 MG per tablet                  Johan Ruiz, PA  08/03/22 0543

## 2022-08-03 NOTE — ED NOTES
Pt arrived by EMS from work and called reporting opening [employer] bus and heard a loud pop in right elbow. EMS reporting feeling a deformity. Pt is in sling provided by EMS.

## 2022-08-08 ENCOUNTER — TELEPHONE (OUTPATIENT)
Dept: ORTHOPEDIC SURGERY | Facility: CLINIC | Age: 62
End: 2022-08-08

## 2022-08-08 ENCOUNTER — HOSPITAL ENCOUNTER (EMERGENCY)
Facility: HOSPITAL | Age: 62
Discharge: HOME | End: 2022-08-08
Attending: EMERGENCY MEDICINE | Admitting: EMERGENCY MEDICINE

## 2022-08-08 VITALS
RESPIRATION RATE: 15 BRPM | DIASTOLIC BLOOD PRESSURE: 81 MMHG | TEMPERATURE: 97 F | SYSTOLIC BLOOD PRESSURE: 149 MMHG | HEART RATE: 79 BPM | OXYGEN SATURATION: 98 %

## 2022-08-08 DIAGNOSIS — M25.521 RIGHT ELBOW PAIN: Primary | ICD-10-CM

## 2022-08-08 PROCEDURE — 99282 EMERGENCY DEPT VISIT SF MDM: CPT

## 2022-08-08 NOTE — ED PROVIDER NOTES
EMERGENCY DEPARTMENT ENCOUNTER    Room Number:  B10/10  Date seen:  8/8/2022  Time seen: 12:21 EDT  PCP: Marcos Silva MD  Historian: patient      HPI:  Chief Complaint: Splint pain    A complete HPI/ROS/PMH/PSH/SH/FH are unobtainable due to: none    Context: Benny Rodríguez is a 62 y.o. male who presents to the ED for evaluation of pain from the splint that was applied a few days ago when he presented to the ER with an elbow injury.  He states it is itchy and he is worried it is too tight, has developed some swelling in his fingers.  He denies any numbness tingling or weakness.  No reinjury.  He has an appointment with orthopedics for this Friday.  Symptoms are constant, mild to moderate, nothing makes it worse or better.        PAST MEDICAL HISTORY  Active Ambulatory Problems     Diagnosis Date Noted   • JAI on autoCPAP 06/18/2017   • Cervical spinal stenosis 09/06/2020   • Left sided numbness 09/06/2020   • Hypertension 09/06/2020   • Hyperlipidemia 09/06/2020   • Diabetes mellitus (HCC) 09/06/2020   • Coronary artery disease 09/06/2020   • CKD (chronic kidney disease) 09/06/2020   • KENZIE (acute kidney injury) (HCC) 09/06/2020   • Headache, migraine 09/07/2020     Resolved Ambulatory Problems     Diagnosis Date Noted   • Suspected COVID-19 virus infection 09/06/2020   • TIA (transient ischemic attack) 09/06/2020     No Additional Past Medical History         PAST SURGICAL HISTORY  Past Surgical History:   Procedure Laterality Date   • CORONARY ANGIOPLASTY WITH STENT PLACEMENT           FAMILY HISTORY  No family history on file.      SOCIAL HISTORY  Social History     Socioeconomic History   • Marital status:    Tobacco Use   • Smoking status: Never Smoker   • Smokeless tobacco: Never Used   Substance and Sexual Activity   • Alcohol use: No   • Drug use: No   • Sexual activity: Defer         ALLERGIES  Ampicillin        REVIEW OF SYSTEMS  Review of Systems     All systems reviewed and negative  except for those discussed in HPI.       PHYSICAL EXAM  ED Triage Vitals [08/08/22 1213]   Temp Heart Rate Resp BP SpO2   97 °F (36.1 °C) 71 16 -- 97 %      Temp src Heart Rate Source Patient Position BP Location FiO2 (%)   -- -- -- -- --         GENERAL: not distressed  HENT: atraumatic  EYES: no scleral icterus  CV: regular rate  RESPIRATORY: normal effort  ABDOMEN: Nondistended  MUSCULOSKELETAL: no deformity.  There is a sugar-tong splint on the patient's right forearm.  Trace edema in the fingers.  It does appear that the ace wrap has slipped off of the proximal portion of the splint and is wrapped directly around his arm, I suspect this is causing the swelling.  Sensation motor function intact in radial ulnar median nerve distributions.  I remove the splint and the skin appears intact with no significant swelling and no skin breakdown or changes.  NEURO: alert, moves all extremities, follows commands  SKIN: warm, dry    Vital signs and nursing notes reviewed.        PROCEDURES  Splint - Cast - Strapping  Performed by: Natalia Goodson PA  Authorized by: Kevin Hsu MD     Consent:     Consent obtained:  Verbal    Consent given by:  Patient    Risks discussed:  Discoloration, numbness, swelling and pain    Alternatives discussed:  No treatment  Pre-procedure details:     Distal neurologic exam:  Normal    Distal perfusion: distal pulses strong and brisk capillary refill    Procedure details:     Location:  Arm    Arm location:  R lower arm    Splint type:  Sugar tong    Supplies:  Cotton padding, elastic bandage, sling and fiberglass    Attestation: Splint applied and adjusted personally by me    Post-procedure details:     Distal neurologic exam:  Normal    Distal perfusion: distal pulses strong and brisk capillary refill      Procedure completion:  Tolerated well, no immediate complications    Post-procedure imaging: not applicable              MEDICATIONS GIVEN IN ER  Medications - No data to  display          PROGRESS AND CONSULTS      ED Course as of 08/08/22 2004   Mon Aug 08, 2022   1222 Medical chart reviewed.  ER visit on August 3, 2022 after having a right elbow injury.  Felt a pop in his elbow, had been unable to straighten it since, x-ray showed an anterior dislocation of the radial head and a chronic appearing fracture deformity of the radial head and a splint was applied and he was discharged to follow-up with orthopedics. [KA]      ED Course User Index  [KA] Natalia Goodson PA     Replaced patient's splint without complication.  I offered to give additional orthopedic information as he expressed interest in a better expedited appointment however he declined because it is through Worker's Compensation and plans to keep his appointment for Friday.  Sling reapplied and he is stable for discharge.        Patient was placed in face mask in first look. Patient was wearing facemask each time I entered the room and throughout our encounter. I wore protective equipment throughout this patient encounter including a face mask, eye shield and gloves. Hand hygiene was performed before donning protective equipment and after removal when leaving the room.        DIAGNOSIS  Final diagnoses:   Right elbow pain         Follow Up:  David Winston MD  [address] [id number] [phone]          Marcos Silva MD  [address] [facility] [id number] [phone]          [facility]  [address] [facility] [id number] [phone]    If symptoms worsen      RX:     Medication List      No changes were made to your prescriptions during this visit.           Latest Documented Vital Signs:  As of 20:04 EDT  BP- 149/81 HR- 79 Temp- 97 °F (36.1 °C) O2 sat- 98%       Natalia Goodson PA  08/08/22 2004

## 2022-08-08 NOTE — TELEPHONE ENCOUNTER
Referral from Dr. Herring for  a Closed displaced fracture of head of right radius- Can [facility] see sooner than 08/12/22?

## 2022-08-08 NOTE — ED NOTES
Patient seen in this ER 8/3/22 for broken arm and had splint applied. States he is unable to get into an orthopedic group and he is starting to have swelling underneath the splint. Also c/o fingers are tingling.

## 2022-08-10 ENCOUNTER — HOSPITAL ENCOUNTER (OUTPATIENT)
Dept: CARDIOLOGY | Facility: HOSPITAL | Age: 62
Discharge: HOME | End: 2022-08-10

## 2022-08-10 ENCOUNTER — OFFICE VISIT (OUTPATIENT)
Dept: ORTHOPEDIC SURGERY | Facility: CLINIC | Age: 62
End: 2022-08-10

## 2022-08-10 ENCOUNTER — LAB (OUTPATIENT)
Dept: LAB | Facility: HOSPITAL | Age: 62
End: 2022-08-10

## 2022-08-10 ENCOUNTER — TRANSCRIBE ORDERS (OUTPATIENT)
Dept: CARDIOLOGY | Facility: HOSPITAL | Age: 62
End: 2022-08-10

## 2022-08-10 ENCOUNTER — PREP FOR SURGERY (OUTPATIENT)
Dept: OTHER | Facility: HOSPITAL | Age: 62
End: 2022-08-10

## 2022-08-10 VITALS — HEIGHT: 69 IN | BODY MASS INDEX: 35.87 KG/M2 | TEMPERATURE: 97.7 F | WEIGHT: 242.2 LBS

## 2022-08-10 DIAGNOSIS — M25.521 RIGHT ELBOW PAIN: ICD-10-CM

## 2022-08-10 DIAGNOSIS — Z01.811 PRE-OP CHEST EXAM: Primary | ICD-10-CM

## 2022-08-10 DIAGNOSIS — S59.901A ELBOW INJURY, RIGHT, INITIAL ENCOUNTER: Primary | ICD-10-CM

## 2022-08-10 DIAGNOSIS — M25.521 RIGHT ELBOW PAIN: Primary | ICD-10-CM

## 2022-08-10 DIAGNOSIS — Z01.818 PREOP EXAMINATION: Primary | ICD-10-CM

## 2022-08-10 DIAGNOSIS — S59.901A ELBOW INJURY, RIGHT, INITIAL ENCOUNTER: ICD-10-CM

## 2022-08-10 LAB
QT INTERVAL: 371 MS
SARS-COV-2 ORF1AB RESP QL NAA+PROBE: NOT DETECTED

## 2022-08-10 PROCEDURE — 99203 OFFICE O/P NEW LOW 30 MIN: CPT | Performed by: ORTHOPAEDIC SURGERY

## 2022-08-10 PROCEDURE — 93010 ELECTROCARDIOGRAM REPORT: CPT | Performed by: INTERNAL MEDICINE

## 2022-08-10 PROCEDURE — 93005 ELECTROCARDIOGRAM TRACING: CPT

## 2022-08-10 PROCEDURE — U0004 COV-19 TEST NON-CDC HGH THRU: HCPCS

## 2022-08-10 NOTE — PROGRESS NOTES
Patient: Benny Rodríguez    YOB: 1960    Medical Record Number: [id number]    Chief Complaints: Right elbow injury    History of Present Illness:     62 y.o. male patient who presents for his right elbow.  He was apparently seen in the emergency room about a week ago.  He had a work injury.  He works as a  for [employer].  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  He had severe pain and was unable to move the elbow after this accident.  He says his elbow was totally normal before this happened.  He reports normal motion and function before the injury.  He did have a fracture of the elbow at the age of 4 years old.  He says it healed uneventfully.    Allergies:   Allergies   Allergen Reactions   • Ampicillin Unknown (See Comments)     Pt does not know reaction       Home Medications:      Current Outpatient Medications:   •  ascorbic acid (VITAMIN C) 1000 MG tablet, Take 1,000 mg by mouth Daily., Disp: , Rfl:   •  aspirin 81 MG chewable tablet, Chew 81 mg Daily., Disp: , Rfl:   •  atorvastatin (LIPITOR) 20 MG tablet, Take 20 mg by mouth Daily., Disp: , Rfl:   •  glimepiride (AMARYL) 4 MG tablet, Take 4 mg by mouth Every Morning Before Breakfast., Disp: , Rfl:   •  HYDROcodone-acetaminophen (NORCO) 7.5-325 MG per tablet, Take 1 tablet by mouth Every 6 (Six) Hours As Needed for Moderate Pain ., Disp: 12 tablet, Rfl: 0  •  lisinopril (PRINIVIL,ZESTRIL) 10 MG tablet, Take 10 mg by mouth Daily., Disp: , Rfl:   •  Omega-3 Fatty Acids (FISH OIL ULTRA) 1400 MG capsule, Take 1,400 mg by mouth Daily., Disp: , Rfl:   •  rOPINIRole (REQUIP) 1 MG tablet, Take 1 mg by mouth Every Night. Take 1 hour before bedtime., Disp: , Rfl:   •  vitamin B-12 (CYANOCOBALAMIN) 1000 MCG tablet, Take 1,000 mcg by mouth Daily., Disp: , Rfl:     Past Medical History:   Diagnosis Date   • Coronary artery disease    • Diabetes mellitus (HCC)    • Hyperlipidemia    • Hypertension        Past Surgical  "History:   Procedure Laterality Date   • CORONARY ANGIOPLASTY WITH STENT PLACEMENT         Social History     Occupational History   • Not on file   Tobacco Use   • Smoking status: Never Smoker   • Smokeless tobacco: Never Used   Vaping Use   • Vaping Use: Never used   Substance and Sexual Activity   • Alcohol use: No   • Drug use: No   • Sexual activity: Defer      Social History     Social History Narrative   • Not on file       History reviewed. No pertinent family history.    Review of Systems:      Constitutional: Denies fever, shaking or chills   Eyes: Denies change in visual acuity   HEENT: Denies nasal congestion or sore throat   Respiratory: Denies cough or shortness of breath   Cardiovascular: Denies chest pain or edema  Endocrine: Denies tremors, palpitations, intolerance of heat or cold, polyuria, polydipsia.  GI: Denies abdominal pain, nausea, vomiting, bloody stools or diarrhea  : Denies frequency, urgency, incontinence, retention, or nocturia.  Musculoskeletal: Denies numbness, tingling or loss of motor function except as above  Integument: Denies rash, lesion or ulceration   Neurologic: Denies headache or focal weakness, deficits  Heme: Denies spontaneous or excessive bleeding, epistaxis, hematuria, melena, fatigue, enlarged or tender lymph nodes.      All other pertinent positives and negatives as noted above in HPI.    Physical Exam: 62 y.o. male    Vitals:    08/10/22 1034   Temp: 97.7 °F (36.5 °C)   TempSrc: Temporal   Weight: 110 kg (242 lb 3.2 oz)   Height: 175.3 cm (69\")       General:  Patient is awake and alert.  Appears in no acute distress or discomfort.    Psych:  Affect and demeanor are appropriate.    Eyes:  Conjunctiva and sclera appear grossly normal.  Eyes track well and EOM seem to be intact.    Ears:  No gross abnormalities.  Hearing adequate for the exam.    Cardiovascular:  Regular rate and rhythm.    Lungs:  Good chest expansion.  Breathing unlabored.    Lymph:  No palpable " masses or adenopathy    Extremities: Right elbow is examined.  He has edema at the elbow.  No edema in the arm or forearm.  Compartments are soft.  He is tender over the radial head but it seems to be fairly mild.  His elbow is held at about 90 degrees of flexion.  I could extend him to about 40 degrees shy of full and he can flex up to about 110 degrees.  Supination is limited to just about 20 degrees.  Pronation is about 60 degrees.  He cannot push or pull without significant pain.  He can move his wrist and fingers normally.  Sensation is intact.  Palpable radial pulse.         Radiology:   Outside x-rays including AP and orthogonal views of the right elbow are viewed along with the radiology report.  He has chronic deformity of the radial head consistent with his history of previous fracture.  He has an anterior radial head dislocation.  The chronicity of this is unable to be determined.    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  I have to assume that this radial head dislocation is new although he certainly has chronic changes there.  I recommend an examination under anesthesia.  I am hoping we can get it to reduce.  I told him that if it is not reducible, I would put him back in the sling and then I would need to refer him to an elbow specialist to talk about an open reduction and probable reconstruction.  I would not plan to do that myself.  I think it is worth trying to reduce him the.  Risk of this were discussed including fracture, the risk that it may not reduce, risk of nerve damage related to manipulation of his arm, risk of anesthesia and the nerve block.  He acknowledged understanding of all this.  He wants to move forward the examination under anesthesia.  This needs to be done urgently so going to try to put it on for tomorrow morning.    David Winston MD    08/10/2022    CC to Marcos Silva  MD

## 2022-08-10 NOTE — H&P (VIEW-ONLY)
Patient: Benny Rodríguez    YOB: 1960    Medical Record Number: [id number]    Chief Complaints: Right elbow injury    History of Present Illness:     62 y.o. male patient who presents for his right elbow.  He was apparently seen in the emergency room about a week ago.  He had a work injury.  He works as a  for [employer].  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  He had severe pain and was unable to move the elbow after this accident.  He says his elbow was totally normal before this happened.  He reports normal motion and function before the injury.  He did have a fracture of the elbow at the age of 4 years old.  He says it healed uneventfully.    Allergies:   Allergies   Allergen Reactions   • Ampicillin Unknown (See Comments)     Pt does not know reaction       Home Medications:      Current Outpatient Medications:   •  ascorbic acid (VITAMIN C) 1000 MG tablet, Take 1,000 mg by mouth Daily., Disp: , Rfl:   •  aspirin 81 MG chewable tablet, Chew 81 mg Daily., Disp: , Rfl:   •  atorvastatin (LIPITOR) 20 MG tablet, Take 20 mg by mouth Daily., Disp: , Rfl:   •  glimepiride (AMARYL) 4 MG tablet, Take 4 mg by mouth Every Morning Before Breakfast., Disp: , Rfl:   •  HYDROcodone-acetaminophen (NORCO) 7.5-325 MG per tablet, Take 1 tablet by mouth Every 6 (Six) Hours As Needed for Moderate Pain ., Disp: 12 tablet, Rfl: 0  •  lisinopril (PRINIVIL,ZESTRIL) 10 MG tablet, Take 10 mg by mouth Daily., Disp: , Rfl:   •  Omega-3 Fatty Acids (FISH OIL ULTRA) 1400 MG capsule, Take 1,400 mg by mouth Daily., Disp: , Rfl:   •  rOPINIRole (REQUIP) 1 MG tablet, Take 1 mg by mouth Every Night. Take 1 hour before bedtime., Disp: , Rfl:   •  vitamin B-12 (CYANOCOBALAMIN) 1000 MCG tablet, Take 1,000 mcg by mouth Daily., Disp: , Rfl:     Past Medical History:   Diagnosis Date   • Coronary artery disease    • Diabetes mellitus (HCC)    • Hyperlipidemia    • Hypertension        Past Surgical  "History:   Procedure Laterality Date   • CORONARY ANGIOPLASTY WITH STENT PLACEMENT         Social History     Occupational History   • Not on file   Tobacco Use   • Smoking status: Never Smoker   • Smokeless tobacco: Never Used   Vaping Use   • Vaping Use: Never used   Substance and Sexual Activity   • Alcohol use: No   • Drug use: No   • Sexual activity: Defer      Social History     Social History Narrative   • Not on file       History reviewed. No pertinent family history.    Review of Systems:      Constitutional: Denies fever, shaking or chills   Eyes: Denies change in visual acuity   HEENT: Denies nasal congestion or sore throat   Respiratory: Denies cough or shortness of breath   Cardiovascular: Denies chest pain or edema  Endocrine: Denies tremors, palpitations, intolerance of heat or cold, polyuria, polydipsia.  GI: Denies abdominal pain, nausea, vomiting, bloody stools or diarrhea  : Denies frequency, urgency, incontinence, retention, or nocturia.  Musculoskeletal: Denies numbness, tingling or loss of motor function except as above  Integument: Denies rash, lesion or ulceration   Neurologic: Denies headache or focal weakness, deficits  Heme: Denies spontaneous or excessive bleeding, epistaxis, hematuria, melena, fatigue, enlarged or tender lymph nodes.      All other pertinent positives and negatives as noted above in HPI.    Physical Exam: 62 y.o. male    Vitals:    08/10/22 1034   Temp: 97.7 °F (36.5 °C)   TempSrc: Temporal   Weight: 110 kg (242 lb 3.2 oz)   Height: 175.3 cm (69\")       General:  Patient is awake and alert.  Appears in no acute distress or discomfort.    Psych:  Affect and demeanor are appropriate.    Eyes:  Conjunctiva and sclera appear grossly normal.  Eyes track well and EOM seem to be intact.    Ears:  No gross abnormalities.  Hearing adequate for the exam.    Cardiovascular:  Regular rate and rhythm.    Lungs:  Good chest expansion.  Breathing unlabored.    Lymph:  No palpable " masses or adenopathy    Extremities: Right elbow is examined.  He has edema at the elbow.  No edema in the arm or forearm.  Compartments are soft.  He is tender over the radial head but it seems to be fairly mild.  His elbow is held at about 90 degrees of flexion.  I could extend him to about 40 degrees shy of full and he can flex up to about 110 degrees.  Supination is limited to just about 20 degrees.  Pronation is about 60 degrees.  He cannot push or pull without significant pain.  He can move his wrist and fingers normally.  Sensation is intact.  Palpable radial pulse.         Radiology:   Outside x-rays including AP and orthogonal views of the right elbow are viewed along with the radiology report.  He has chronic deformity of the radial head consistent with his history of previous fracture.  He has an anterior radial head dislocation.  The chronicity of this is unable to be determined.    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  I have to assume that this radial head dislocation is new although he certainly has chronic changes there.  I recommend an examination under anesthesia.  I am hoping we can get it to reduce.  I told him that if it is not reducible, I would put him back in the sling and then I would need to refer him to an elbow specialist to talk about an open reduction and probable reconstruction.  I would not plan to do that myself.  I think it is worth trying to reduce him the.  Risk of this were discussed including fracture, the risk that it may not reduce, risk of nerve damage related to manipulation of his arm, risk of anesthesia and the nerve block.  He acknowledged understanding of all this.  He wants to move forward the examination under anesthesia.  This needs to be done urgently so going to try to put it on for tomorrow morning.    David Winston MD    08/10/2022    CC to Marcos Silva  MD

## 2022-08-11 ENCOUNTER — HOSPITAL ENCOUNTER (OUTPATIENT)
Facility: HOSPITAL | Age: 62
Setting detail: HOSPITAL OUTPATIENT SURGERY
Discharge: HOME | End: 2022-08-11
Attending: ORTHOPAEDIC SURGERY | Admitting: ORTHOPAEDIC SURGERY

## 2022-08-11 ENCOUNTER — ANESTHESIA (OUTPATIENT)
Dept: PERIOP | Facility: HOSPITAL | Age: 62
End: 2022-08-11

## 2022-08-11 ENCOUNTER — APPOINTMENT (OUTPATIENT)
Dept: GENERAL RADIOLOGY | Facility: HOSPITAL | Age: 62
End: 2022-08-11

## 2022-08-11 ENCOUNTER — PATIENT ROUNDING (BHMG ONLY) (OUTPATIENT)
Dept: ORTHOPEDIC SURGERY | Facility: CLINIC | Age: 62
End: 2022-08-11

## 2022-08-11 ENCOUNTER — ANESTHESIA EVENT (OUTPATIENT)
Dept: PERIOP | Facility: HOSPITAL | Age: 62
End: 2022-08-11

## 2022-08-11 VITALS
HEART RATE: 59 BPM | SYSTOLIC BLOOD PRESSURE: 170 MMHG | OXYGEN SATURATION: 95 % | DIASTOLIC BLOOD PRESSURE: 92 MMHG | RESPIRATION RATE: 16 BRPM

## 2022-08-11 LAB — GLUCOSE BLDC GLUCOMTR-MCNC: 210 MG/DL (ref 70–130)

## 2022-08-11 PROCEDURE — 25010000002 FENTANYL CITRATE (PF) 50 MCG/ML SOLUTION: Performed by: REGISTERED NURSE

## 2022-08-11 PROCEDURE — 82962 GLUCOSE BLOOD TEST: CPT

## 2022-08-11 PROCEDURE — 73070 X-RAY EXAM OF ELBOW: CPT | Performed by: ORTHOPAEDIC SURGERY

## 2022-08-11 PROCEDURE — 25010000002 PROPOFOL 10 MG/ML EMULSION: Performed by: REGISTERED NURSE

## 2022-08-11 PROCEDURE — 76000 FLUOROSCOPY <1 HR PHYS/QHP: CPT | Performed by: ORTHOPAEDIC SURGERY

## 2022-08-11 PROCEDURE — 24605 TX CLSD ELBOW DISLC REQ ANES: CPT | Performed by: ORTHOPAEDIC SURGERY

## 2022-08-11 RX ORDER — HYDRALAZINE HYDROCHLORIDE 20 MG/ML
5 INJECTION INTRAMUSCULAR; INTRAVENOUS
Status: DISCONTINUED | OUTPATIENT
Start: 2022-08-11 | End: 2022-08-11 | Stop reason: HOSPADM

## 2022-08-11 RX ORDER — IBUPROFEN 600 MG/1
600 TABLET ORAL ONCE AS NEEDED
Status: DISCONTINUED | OUTPATIENT
Start: 2022-08-11 | End: 2022-08-11 | Stop reason: HOSPADM

## 2022-08-11 RX ORDER — ONDANSETRON 2 MG/ML
4 INJECTION INTRAMUSCULAR; INTRAVENOUS ONCE AS NEEDED
Status: DISCONTINUED | OUTPATIENT
Start: 2022-08-11 | End: 2022-08-11 | Stop reason: HOSPADM

## 2022-08-11 RX ORDER — PROMETHAZINE HYDROCHLORIDE 25 MG/1
25 TABLET ORAL ONCE AS NEEDED
Status: DISCONTINUED | OUTPATIENT
Start: 2022-08-11 | End: 2022-08-11 | Stop reason: HOSPADM

## 2022-08-11 RX ORDER — PROMETHAZINE HYDROCHLORIDE 25 MG/1
25 SUPPOSITORY RECTAL ONCE AS NEEDED
Status: DISCONTINUED | OUTPATIENT
Start: 2022-08-11 | End: 2022-08-11 | Stop reason: HOSPADM

## 2022-08-11 RX ORDER — LIFITEGRAST 50 MG/ML
1 SOLUTION/ DROPS OPHTHALMIC EVERY 12 HOURS
COMMUNITY
Start: 2022-08-09

## 2022-08-11 RX ORDER — ONDANSETRON 4 MG/1
4 TABLET, FILM COATED ORAL EVERY 8 HOURS PRN
Qty: 30 TABLET | Refills: 0 | Status: SHIPPED | OUTPATIENT
Start: 2022-08-11

## 2022-08-11 RX ORDER — DOCUSATE SODIUM 100 MG/1
100 CAPSULE, LIQUID FILLED ORAL 2 TIMES DAILY
Qty: 60 CAPSULE | Refills: 0 | Status: SHIPPED | OUTPATIENT
Start: 2022-08-11

## 2022-08-11 RX ORDER — MIDAZOLAM HYDROCHLORIDE 1 MG/ML
1 INJECTION INTRAMUSCULAR; INTRAVENOUS
Status: DISCONTINUED | OUTPATIENT
Start: 2022-08-11 | End: 2022-08-11 | Stop reason: HOSPADM

## 2022-08-11 RX ORDER — PROPOFOL 10 MG/ML
VIAL (ML) INTRAVENOUS AS NEEDED
Status: DISCONTINUED | OUTPATIENT
Start: 2022-08-11 | End: 2022-08-11 | Stop reason: SURG

## 2022-08-11 RX ORDER — HYDROCODONE BITARTRATE AND ACETAMINOPHEN 7.5; 325 MG/1; MG/1
1 TABLET ORAL ONCE AS NEEDED
Status: COMPLETED | OUTPATIENT
Start: 2022-08-11 | End: 2022-08-11

## 2022-08-11 RX ORDER — NALOXONE HCL 0.4 MG/ML
0.2 VIAL (ML) INJECTION AS NEEDED
Status: DISCONTINUED | OUTPATIENT
Start: 2022-08-11 | End: 2022-08-11 | Stop reason: HOSPADM

## 2022-08-11 RX ORDER — HYDROMORPHONE HYDROCHLORIDE 1 MG/ML
0.5 INJECTION, SOLUTION INTRAMUSCULAR; INTRAVENOUS; SUBCUTANEOUS
Status: DISCONTINUED | OUTPATIENT
Start: 2022-08-11 | End: 2022-08-11 | Stop reason: HOSPADM

## 2022-08-11 RX ORDER — HYDROCODONE BITARTRATE AND ACETAMINOPHEN 7.5; 325 MG/1; MG/1
1 TABLET ORAL EVERY 4 HOURS PRN
Qty: 50 TABLET | Refills: 0 | Status: SHIPPED | OUTPATIENT
Start: 2022-08-11

## 2022-08-11 RX ORDER — SODIUM CHLORIDE 0.9 % (FLUSH) 0.9 %
10 SYRINGE (ML) INJECTION EVERY 12 HOURS SCHEDULED
Status: DISCONTINUED | OUTPATIENT
Start: 2022-08-11 | End: 2022-08-11 | Stop reason: HOSPADM

## 2022-08-11 RX ORDER — LABETALOL HYDROCHLORIDE 5 MG/ML
5 INJECTION, SOLUTION INTRAVENOUS
Status: DISCONTINUED | OUTPATIENT
Start: 2022-08-11 | End: 2022-08-11 | Stop reason: HOSPADM

## 2022-08-11 RX ORDER — EPHEDRINE SULFATE 50 MG/ML
5 INJECTION, SOLUTION INTRAVENOUS ONCE AS NEEDED
Status: DISCONTINUED | OUTPATIENT
Start: 2022-08-11 | End: 2022-08-11 | Stop reason: HOSPADM

## 2022-08-11 RX ORDER — SODIUM CHLORIDE, SODIUM LACTATE, POTASSIUM CHLORIDE, CALCIUM CHLORIDE 600; 310; 30; 20 MG/100ML; MG/100ML; MG/100ML; MG/100ML
9 INJECTION, SOLUTION INTRAVENOUS CONTINUOUS PRN
Status: DISCONTINUED | OUTPATIENT
Start: 2022-08-11 | End: 2022-08-11 | Stop reason: HOSPADM

## 2022-08-11 RX ORDER — LIDOCAINE HYDROCHLORIDE 10 MG/ML
0.5 INJECTION, SOLUTION EPIDURAL; INFILTRATION; INTRACAUDAL; PERINEURAL ONCE AS NEEDED
Status: DISCONTINUED | OUTPATIENT
Start: 2022-08-11 | End: 2022-08-11 | Stop reason: HOSPADM

## 2022-08-11 RX ORDER — FENTANYL CITRATE 50 UG/ML
50 INJECTION, SOLUTION INTRAMUSCULAR; INTRAVENOUS
Status: DISCONTINUED | OUTPATIENT
Start: 2022-08-11 | End: 2022-08-11 | Stop reason: HOSPADM

## 2022-08-11 RX ORDER — SODIUM CHLORIDE 0.9 % (FLUSH) 0.9 %
10 SYRINGE (ML) INJECTION AS NEEDED
Status: DISCONTINUED | OUTPATIENT
Start: 2022-08-11 | End: 2022-08-11 | Stop reason: HOSPADM

## 2022-08-11 RX ORDER — OXYCODONE AND ACETAMINOPHEN 7.5; 325 MG/1; MG/1
1 TABLET ORAL EVERY 4 HOURS PRN
Status: DISCONTINUED | OUTPATIENT
Start: 2022-08-11 | End: 2022-08-11 | Stop reason: HOSPADM

## 2022-08-11 RX ORDER — DIPHENHYDRAMINE HYDROCHLORIDE 50 MG/ML
12.5 INJECTION INTRAMUSCULAR; INTRAVENOUS
Status: DISCONTINUED | OUTPATIENT
Start: 2022-08-11 | End: 2022-08-11 | Stop reason: HOSPADM

## 2022-08-11 RX ORDER — HYDROCODONE BITARTRATE AND ACETAMINOPHEN 5; 325 MG/1; MG/1
1 TABLET ORAL EVERY 6 HOURS PRN
Qty: 30 TABLET | Refills: 0 | Status: SHIPPED | OUTPATIENT
Start: 2022-08-11

## 2022-08-11 RX ORDER — DIPHENHYDRAMINE HCL 25 MG
25 CAPSULE ORAL
Status: DISCONTINUED | OUTPATIENT
Start: 2022-08-11 | End: 2022-08-11 | Stop reason: HOSPADM

## 2022-08-11 RX ORDER — FLUMAZENIL 0.1 MG/ML
0.2 INJECTION INTRAVENOUS AS NEEDED
Status: DISCONTINUED | OUTPATIENT
Start: 2022-08-11 | End: 2022-08-11 | Stop reason: HOSPADM

## 2022-08-11 RX ORDER — LIDOCAINE HYDROCHLORIDE 20 MG/ML
INJECTION, SOLUTION INFILTRATION; PERINEURAL AS NEEDED
Status: DISCONTINUED | OUTPATIENT
Start: 2022-08-11 | End: 2022-08-11 | Stop reason: SURG

## 2022-08-11 RX ADMIN — LIDOCAINE HYDROCHLORIDE 60 MG: 20 INJECTION, SOLUTION INFILTRATION; PERINEURAL at 12:14

## 2022-08-11 RX ADMIN — HYDROCODONE BITARTRATE AND ACETAMINOPHEN 1 TABLET: 7.5; 325 TABLET ORAL at 13:23

## 2022-08-11 RX ADMIN — FENTANYL CITRATE 50 MCG: 50 INJECTION INTRAMUSCULAR; INTRAVENOUS at 12:49

## 2022-08-11 RX ADMIN — SODIUM CHLORIDE, POTASSIUM CHLORIDE, SODIUM LACTATE AND CALCIUM CHLORIDE 9 ML/HR: 600; 310; 30; 20 INJECTION, SOLUTION INTRAVENOUS at 10:39

## 2022-08-11 RX ADMIN — PROPOFOL 100 MG: 10 INJECTION, EMULSION INTRAVENOUS at 12:14

## 2022-08-11 NOTE — ANESTHESIA PREPROCEDURE EVALUATION
Anesthesia Evaluation     Patient summary reviewed and Nursing notes reviewed   NPO Solid Status: > 8 hours  NPO Liquid Status: > 2 hours           Airway   Mallampati: II  TM distance: >3 FB  Neck ROM: full  No difficulty expected  Dental - normal exam     Pulmonary - normal exam   (+) sleep apnea on CPAP,   Cardiovascular - normal exam  Exercise tolerance: good (4-7 METS)    Patient on routine beta blocker and Beta blocker given within 24 hours of surgery    (+) hypertension, CAD, cardiac stents more than 12 months ago hyperlipidemia,       Neuro/Psych  (+) headaches, numbness,    GI/Hepatic/Renal/Endo    (+)   renal disease CRI, diabetes mellitus type 2,     Musculoskeletal (-) negative ROS    Abdominal    Substance History - negative use     OB/GYN          Other                        Anesthesia Plan    ASA 3     MAC     intravenous induction     Anesthetic plan, risks, benefits, and alternatives have been provided, discussed and informed consent has been obtained with: patient.    Plan discussed with CRNA.        CODE STATUS:

## 2022-08-11 NOTE — OP NOTE
Orthopaedic Operative Note    Facility: [facility]    Patient: Benny Rodríguez    Medical Record Number: [id number]    YOB: 1960    Dictating Surgeon: David Winston M.D.*    Primary Care Physician: Marcos Silva MD    Date of Operation: 8/11/2022    Pre-Operative Diagnosis: Acute versus chronic dislocation of right radial head left chronic radial head deformity    Post-Operative Diagnosis: Likely chronic radial head dislocation with radial head deformity    Procedure Performed: Attempted closed reduction under anesthesia of right radial head    Surgeon: David Winston MD     Assistant: None    Anesthesia: Monitored anesthesia care    Complications: None.     Estimated Blood Loss: None    Implants: None    Specimens: * No orders in the log *    Brief Operative Indication: Mr. Rodríguez presented to my office with a dislocation of his right radial head and chronic deformity of the radial head.  He says that he had a fracture at the age of 4 which was managed nonsurgically.  We do not have any prior x-rays of his elbow for comparison.  He had a work injury in which he felt something pop in his elbow.  He had severe pain and inability to move the elbow after the injury.  We talked about the fact that this may be a chronic dislocation but he insisted that his elbow was completely normal prior to the injury.  The risk, benefits and alternatives to an examination under anesthesia were discussed with him in detail.  I explained that I would try to reduce it but since it is my suspicion that this is probably chronic, I would not be too aggressive in that effort.  If it reduces then I would manage him appropriately for that but if it did not reduce, I told him I would need to send him to a specialist for complex elbow pathology.  The risk, benefits and alternatives to the attempted reduction were discussed.  He consented.    Description of the procedure in detail: The patient and  operative site were identified in the preoperative area and the area.  Surgical site was marked.  He was then taken to the operating room.  A timeout was taken.  Adequate monitored anesthesia care was then administered.  Under fluoroscopy, I examined his elbow.  He had chronic deformity of the radial head with an anterior dislocation.  With range of motion I could feel some crepitus in the elbow.  I could extend him to about 15 degrees shy of full and then flex him up to about 125 degrees.  Supination was limited to about 70 degrees pronation was full.  His radial head was not reducible.  As described above, I was not very aggressive in manipulating his elbow for fear of fracturing his arm.  My impression was that this was probably a chronic radial head dislocation.  Images were taken and saved.  His arm was placed in a splint followed by a sling.  He was awakened and taken recovery in good condition.    David Winston MD  08/11/22

## 2022-08-11 NOTE — PROGRESS NOTES
A [employer] Message has been sent to the patient for PATIENT ROUNDING with [facility]   Spouse

## 2022-08-11 NOTE — ANESTHESIA POSTPROCEDURE EVALUATION
Patient: Benny Rodríguez    Procedure Summary     Date: 08/11/22 Room / Location:  [facility] [address] [place] [address] [facility]    Anesthesia Start: 1205 Anesthesia Stop: 1230    Procedure: ELBOW CLOSED REDUCTION (Right Elbow) Diagnosis:       Elbow injury, right, initial encounter      Right elbow pain      (Elbow injury, right, initial encounter [S59.901A])      (Right elbow pain [M25.521])    Surgeons: David Winston MD Provider: Dejuan Sanchez MD    Anesthesia Type: MAC ASA Status: 3          Anesthesia Type: MAC    Vitals  Vitals Value Taken Time   /82 08/11/22 1245   Temp     Pulse 59 08/11/22 1257   Resp     SpO2 95 % 08/11/22 1257   Vitals shown include unvalidated device data.        Post Anesthesia Care and Evaluation    Patient location during evaluation: bedside  Patient participation: complete - patient participated  Level of consciousness: awake and alert  Pain score: 0  Pain management: adequate    Airway patency: patent  Anesthetic complications: No anesthetic complications  PONV Status: none  Cardiovascular status: acceptable  Respiratory status: acceptable  Hydration status: acceptable    Comments: /63   Pulse 63   Resp 16   SpO2 100%

## 2022-08-11 NOTE — BRIEF OP NOTE
ELBOW CLOSED REDUCTION  Progress Note    Benny Rodríguez  8/11/2022    Pre-op Diagnosis:   Elbow injury, right, initial encounter [S59.901A]  Right elbow pain [M25.521]       Post-Op Diagnosis Codes:     * Elbow injury, right, initial encounter [S59.901A]     * Right elbow pain [M25.521]    Procedure/CPT® Codes:        Procedure(s):  ELBOW CLOSED REDUCTION    Surgeon(s):  David Winston MD    Anesthesia: Sedation    Staff:   Circulator: Melodie Neil RN  Radiology Technologist: Nalini Mckeon  Scrub Person: Elli Weaver         Estimated Blood Loss: none    Urine Voided: * No values recorded between 8/11/2022 12:00 PM and 8/11/2022 12:27 PM *    Specimens:                None          Drains: * No LDAs found *    Findings: see dictatoin        Complications: none          David Winston MD     Date: 8/11/2022  Time: 12:39 EDT

## 2022-08-12 ENCOUNTER — TELEPHONE (OUTPATIENT)
Dept: ORTHOPEDIC SURGERY | Facility: CLINIC | Age: 62
End: 2022-08-12

## 2022-08-12 NOTE — TELEPHONE ENCOUNTER
Have spoken with , Mary Lambert.  She has authorized the patient to see Dr. Davis.  Will fax the authorization to the office.  Have notified the patient that I will get in touch with Dr. Davis's office to make appt as soon as I get the authorization and can send it to his office.

## 2022-08-12 NOTE — TELEPHONE ENCOUNTER
Notify the patient that I did contact Dr. Dvais's office and [facility] before they can schedule the appointment.  I did leave a message with his  through work comp and will get back with him once I have talked to the .

## 2022-08-12 NOTE — TELEPHONE ENCOUNTER
DR EASON/CLINICAL: RE: REFERRAL TO OTHER SURGEON 08/12/2022 PATIENT CALLED TO SPEAK TO DR EASON'S ASSISTANT RE: BEING REFERRED TO ANOTHER (SEE OP NOTE).  PATIENT ADVISED WOULD AVE CLINICAL CALL HIM RE: STATUS.

## 2022-08-12 NOTE — TELEPHONE ENCOUNTER
"PATIENT CALLED TO ASK IF DR. EASON WOULD REFER HIM TO [facility] FOR HIS RIGHT ELBOW. I MENTIONED THE DOCUMENTATION OF CALL \"FROM PATIENT\" TODAY AT 12:12 pm ABOUT THIS AND PATIENT SAYS THAT WAS NOT HIM THAT CALLED. SAID MAYBE IT WAS HIS WORK COMP , [employer]KINS? MESSAGE CLEARLY STATES IT'S A CALL FROM PATIENT. ALSO, PATIENT SAYS WE NEED TO CONTACT ADELA ADKINS TO GET AUTHORIZATION FOR REFERRAL TO ANOTHER SPECIALIST.  WORK COMP  PHONE# [phone]    PATIENT PHONE # [phone]  "

## 2022-08-16 ENCOUNTER — TELEPHONE (OUTPATIENT)
Dept: ORTHOPEDIC SURGERY | Facility: CLINIC | Age: 62
End: 2022-08-16

## 2022-08-16 DIAGNOSIS — S59.901A ELBOW INJURY, RIGHT, INITIAL ENCOUNTER: Primary | ICD-10-CM

## 2022-08-16 NOTE — TELEPHONE ENCOUNTER
Have received the approval from work comp for patient to see hand surgeon.  The referral is for [facility].  Dr. Davis is not with [facility] however according to the nurse  patient request to [facility].  When I contacted their office to make the appointment they had already received the referral and the patient has an appointment with Dr. Zepeda on August 22.  I have faxed a copy of the referral to [facility] and [phone]

## 2023-07-25 ENCOUNTER — TREATMENT (OUTPATIENT)
Dept: PHYSICAL THERAPY | Facility: CLINIC | Age: 63
End: 2023-07-25
Payer: OTHER MISCELLANEOUS

## 2023-07-25 DIAGNOSIS — Q74.0 CONGENITAL DISLOCATION OF RADIAL HEAD: Primary | ICD-10-CM

## 2023-07-25 PROCEDURE — 97545 WORK HARDENING: CPT | Performed by: PHYSICAL THERAPIST

## 2023-07-25 NOTE — PROGRESS NOTES
Physical Therapy Daily Treatment Note  [address], [address]  [place] [address]      Patient: Benny Rodríguez   : 1960  Referring practitioner: Fady Pack, *  Date of Initial Visit: Type: THERAPY  Noted: 2023  Today's Date: 2023  Patient seen for 8 sessions       Visit Diagnoses:    ICD-10-CM ICD-9-CM   1. Congenital dislocation of radial head  Q74.0 754.89           Subjective   Patient reports that the elbow is feeling better.  Goes back to full duty on 8/10/23.    Objective   See Exercise, Manual, and Modality Logs for complete treatment.       Assessment/Plan  Subjective reports continue to improve.  Patient performed the work conditioning program without significant visual or verbal signs of pain in the elbow.  Patient did report tenderness in the back of the elbow throughout the routine and was seen rubbing it between exercises.      Timed:         Manual Therapy:    0     mins  37018;     Therapeutic Exercise:    0     mins  95023;     Neuromuscular Dylan:    0    mins  37510;    Therapeutic Activity:     0     mins  70195;     Gait Training      0    mins  80343;  Work Conditioning     46   mins  51789       Untimed:  Electrical Stimulation:    0     mins  39180 ( );      Timed Treatment:   46   mins   Total Treatment:     46   mins    Brandt Mohan, PT  [place] License: [id number]

## 2023-08-30 ENCOUNTER — DOCUMENTATION (OUTPATIENT)
Dept: PHYSICAL THERAPY | Facility: CLINIC | Age: 63
End: 2023-08-30
Payer: COMMERCIAL

## 2023-09-19 ENCOUNTER — TRANSCRIBE ORDERS (OUTPATIENT)
Dept: PHYSICAL THERAPY | Facility: CLINIC | Age: 63
End: 2023-09-19
Payer: COMMERCIAL

## 2023-09-19 DIAGNOSIS — Z48.89 ENCOUNTER FOR OTHER SPECIFIED SURGICAL AFTERCARE: ICD-10-CM

## 2023-09-19 DIAGNOSIS — S53.014D: Primary | ICD-10-CM

## 2023-09-19 DIAGNOSIS — M79.631 PAIN OF RIGHT FOREARM: ICD-10-CM

## 2023-11-17 ENCOUNTER — TREATMENT (OUTPATIENT)
Dept: PHYSICAL THERAPY | Facility: CLINIC | Age: 63
End: 2023-11-17
Payer: OTHER MISCELLANEOUS

## 2023-11-17 DIAGNOSIS — S53.004D: Primary | ICD-10-CM

## 2023-11-17 NOTE — PROGRESS NOTES
See full documentation of today's FCE in patient's paper chart dated 11/17/2023.        Procedures:    Physical Performance Tests:  _235___ mins 11849      Roxanne Torres, PT   [place] license #[id number]

## 2025-01-24 ENCOUNTER — TELEPHONE (OUTPATIENT)
Dept: SLEEP MEDICINE | Facility: HOSPITAL | Age: 65
End: 2025-01-24
Payer: COMMERCIAL

## 2025-01-24 ENCOUNTER — OFFICE VISIT (OUTPATIENT)
Dept: SLEEP MEDICINE | Facility: HOSPITAL | Age: 65
End: 2025-01-24
Payer: COMMERCIAL

## 2025-01-24 VITALS
HEART RATE: 68 BPM | BODY MASS INDEX: 29.18 KG/M2 | HEIGHT: 69 IN | OXYGEN SATURATION: 96 % | WEIGHT: 197 LBS | SYSTOLIC BLOOD PRESSURE: 125 MMHG | DIASTOLIC BLOOD PRESSURE: 81 MMHG

## 2025-01-24 DIAGNOSIS — R63.4 WEIGHT LOSS OBSERVED ON EXAMINATION: ICD-10-CM

## 2025-01-24 DIAGNOSIS — G47.33 OSA (OBSTRUCTIVE SLEEP APNEA): Primary | ICD-10-CM

## 2025-01-24 DIAGNOSIS — Z02.89 ENCOUNTER FOR EXAMINATION REQUIRED BY DEPARTMENT OF TRANSPORTATION (DOT): ICD-10-CM

## 2025-01-24 DIAGNOSIS — E66.3 OVERWEIGHT (BMI 25.0-29.9): ICD-10-CM

## 2025-01-24 PROCEDURE — G0463 HOSPITAL OUTPT CLINIC VISIT: HCPCS

## 2025-02-06 ENCOUNTER — HOSPITAL ENCOUNTER (OUTPATIENT)
Dept: SLEEP MEDICINE | Facility: HOSPITAL | Age: 65
Discharge: HOME | End: 2025-02-06
Admitting: NURSE PRACTITIONER
Payer: COMMERCIAL

## 2025-02-06 DIAGNOSIS — R63.4 WEIGHT LOSS OBSERVED ON EXAMINATION: ICD-10-CM

## 2025-02-06 DIAGNOSIS — G47.33 OSA (OBSTRUCTIVE SLEEP APNEA): ICD-10-CM

## 2025-02-06 PROCEDURE — G0399 HOME SLEEP TEST/TYPE 3 PORTA: HCPCS

## 2025-02-12 ENCOUNTER — TELEPHONE (OUTPATIENT)
Dept: SLEEP MEDICINE | Facility: HOSPITAL | Age: 65
End: 2025-02-12
Payer: COMMERCIAL

## 2025-02-12 NOTE — TELEPHONE ENCOUNTER
I called Pt and lvm going over negative sleep reort for JAI AND LET pT KNOW THEY CAN DISCONTINUE CPAP USE PER

## 2025-07-23 ENCOUNTER — HOSPITAL ENCOUNTER (EMERGENCY)
Facility: HOSPITAL | Age: 65
Discharge: HOME | End: 2025-07-23
Attending: EMERGENCY MEDICINE
Payer: COMMERCIAL

## 2025-07-23 ENCOUNTER — APPOINTMENT (OUTPATIENT)
Dept: GENERAL RADIOLOGY | Facility: HOSPITAL | Age: 65
End: 2025-07-23
Payer: COMMERCIAL

## 2025-07-23 VITALS
TEMPERATURE: 98.1 F | BODY MASS INDEX: 28.14 KG/M2 | OXYGEN SATURATION: 98 % | RESPIRATION RATE: 18 BRPM | HEART RATE: 64 BPM | HEIGHT: 69 IN | SYSTOLIC BLOOD PRESSURE: 143 MMHG | WEIGHT: 190 LBS | DIASTOLIC BLOOD PRESSURE: 82 MMHG

## 2025-07-23 DIAGNOSIS — S61.219A LACERATION OF FINGER, LEFT, WITH TENDON, INITIAL ENCOUNTER: Primary | ICD-10-CM

## 2025-07-23 PROCEDURE — 73130 X-RAY EXAM OF HAND: CPT

## 2025-07-23 PROCEDURE — 25010000002 LIDOCAINE 1% - EPINEPHRINE 1:100000 1 %-1:100000 SOLUTION: Performed by: PHYSICIAN ASSISTANT

## 2025-07-23 PROCEDURE — 99283 EMERGENCY DEPT VISIT LOW MDM: CPT

## 2025-07-23 RX ORDER — ACETAMINOPHEN 325 MG/1
650 TABLET ORAL ONCE
Status: COMPLETED | OUTPATIENT
Start: 2025-07-23 | End: 2025-07-23

## 2025-07-23 RX ORDER — CEPHALEXIN 500 MG/1
500 CAPSULE ORAL ONCE
Status: COMPLETED | OUTPATIENT
Start: 2025-07-23 | End: 2025-07-23

## 2025-07-23 RX ORDER — HYDROCODONE BITARTRATE AND ACETAMINOPHEN 5; 325 MG/1; MG/1
1 TABLET ORAL ONCE
Refills: 0 | Status: COMPLETED | OUTPATIENT
Start: 2025-07-23 | End: 2025-07-23

## 2025-07-23 RX ORDER — CEPHALEXIN 500 MG/1
500 CAPSULE ORAL 2 TIMES DAILY
Qty: 14 CAPSULE | Refills: 0 | Status: SHIPPED | OUTPATIENT
Start: 2025-07-23 | End: 2025-07-30

## 2025-07-23 RX ORDER — ACETAMINOPHEN 500 MG
1000 TABLET ORAL ONCE
Status: DISCONTINUED | OUTPATIENT
Start: 2025-07-23 | End: 2025-07-23

## 2025-07-23 RX ORDER — LIDOCAINE HYDROCHLORIDE AND EPINEPHRINE 10; 10 MG/ML; UG/ML
10 INJECTION, SOLUTION INFILTRATION; PERINEURAL ONCE
Status: COMPLETED | OUTPATIENT
Start: 2025-07-23 | End: 2025-07-23

## 2025-07-23 RX ADMIN — LIDOCAINE HYDROCHLORIDE AND EPINEPHRINE 10 ML: 10; 10 INJECTION, SOLUTION INFILTRATION; PERINEURAL at 21:38

## 2025-07-23 RX ADMIN — CEPHALEXIN 500 MG: 500 CAPSULE ORAL at 22:34

## 2025-07-23 RX ADMIN — HYDROCODONE BITARTRATE AND ACETAMINOPHEN 1 TABLET: 5; 325 TABLET ORAL at 21:09

## 2025-07-23 RX ADMIN — ACETAMINOPHEN 650 MG: 500 TABLET, FILM COATED ORAL at 21:08

## 2025-07-24 ENCOUNTER — HOSPITAL ENCOUNTER (EMERGENCY)
Facility: HOSPITAL | Age: 65
Discharge: HOME | End: 2025-07-24
Attending: EMERGENCY MEDICINE
Payer: COMMERCIAL

## 2025-07-24 VITALS
HEART RATE: 70 BPM | BODY MASS INDEX: 28.15 KG/M2 | HEIGHT: 69 IN | DIASTOLIC BLOOD PRESSURE: 77 MMHG | SYSTOLIC BLOOD PRESSURE: 133 MMHG | OXYGEN SATURATION: 97 % | TEMPERATURE: 97.4 F | WEIGHT: 190.04 LBS | RESPIRATION RATE: 18 BRPM

## 2025-07-24 DIAGNOSIS — M79.645 FINGER PAIN, LEFT: Primary | ICD-10-CM

## 2025-07-24 PROCEDURE — 99283 EMERGENCY DEPT VISIT LOW MDM: CPT

## 2025-07-24 RX ORDER — ACETAMINOPHEN 500 MG
1000 TABLET ORAL ONCE
Status: COMPLETED | OUTPATIENT
Start: 2025-07-24 | End: 2025-07-24

## 2025-07-24 RX ORDER — HYDROCODONE BITARTRATE AND ACETAMINOPHEN 5; 325 MG/1; MG/1
1 TABLET ORAL EVERY 6 HOURS PRN
Qty: 12 TABLET | Refills: 0 | Status: SHIPPED | OUTPATIENT
Start: 2025-07-24 | End: 2025-07-27

## 2025-07-24 RX ADMIN — ACETAMINOPHEN 1000 MG: 500 TABLET, FILM COATED ORAL at 12:19

## 2025-07-24 NOTE — DISCHARGE INSTRUCTIONS
Call phone number above for Dr. Baron during 8 AM tomorrow.  Keep splint on finger to protect your tendon.  Take cephalexin as prescribed to prevent infection.  Return to emergency department for any worsening symptoms.

## 2025-07-24 NOTE — ED PROVIDER NOTES
EMERGENCY DEPARTMENT ENCOUNTER  Room Number:  36/36  PCP: Marcos Silva MD  Independent Historians: Historian: Patient      HPI:  Chief Complaint: had concerns including Finger Laceration.     A complete HPI/ROS/PMH/PSH/SH/FH are unobtainable due to: EM_Unobtainable History : None    Chronic or social conditions impacting patient care (Social Determinants of Health): None      Context: Benny Rodríguez is a 65 y.o. male with a medical history of JAI, hypertension, hyperlipidemia, diabetes, CAD, CKD who presents to the ED c/o acute laceration.  Patient reports this evening he was doing dishes when a dish lacerated his left hand on the dorsum.  He is left-hand dominant.  Reports he is unable to extend the left ring finger.  He is not anticoagulated.  Last Tdap 2023.  No other systemic complaints at this time.      Review of prior external notes (non-ED) -and- Review of prior external test results outside of this encounter: Patient seen in office by hand surgery on 7/2/2025 for osteoarthritis of right elbow.  Reviewed assessment and plan.  Will have patient follow-up for cortisone injection in 6 weeks. Reviewed labs collected on 2/6/2025.  CBC with hemoglobin 14.2, CMP with creatinine 1.52.    Prescription drug monitoring program review:     EM_[place] : N/A    PAST MEDICAL HISTORY  Active Ambulatory Problems     Diagnosis Date Noted    JAI on autoCPAP 06/18/2017    Cervical spinal stenosis 09/06/2020    Left sided numbness 09/06/2020    Hypertension 09/06/2020    Hyperlipidemia 09/06/2020    Diabetes mellitus 09/06/2020    Coronary artery disease 09/06/2020    CKD (chronic kidney disease) 09/06/2020    KENZIE (acute kidney injury) 09/06/2020    Headache, migraine 09/07/2020    Elbow injury, right, initial encounter 08/10/2022    Right elbow pain 08/10/2022     Resolved Ambulatory Problems     Diagnosis Date Noted    Suspected COVID-19 virus infection 09/06/2020    TIA (transient ischemic attack) 09/06/2020      No Additional Past Medical History         PAST SURGICAL HISTORY  Past Surgical History:   Procedure Laterality Date    CORONARY ANGIOPLASTY WITH STENT PLACEMENT      ELBOW CLOSED REDUCTION Right 8/11/2022    Procedure: ELBOW CLOSED REDUCTION;  Surgeon: David Winston MD;  Location: [facility] OR [facility] [place];  Service: Orthopedics;  Laterality: Right;         FAMILY HISTORY  History reviewed. No pertinent family history.      SOCIAL HISTORY  Social History     Socioeconomic History    Marital status:    Tobacco Use    Smoking status: Never    Smokeless tobacco: Never   Vaping Use    Vaping status: Never Used   Substance and Sexual Activity    Alcohol use: No    Drug use: No    Sexual activity: Defer         ALLERGIES  Ampicillin      REVIEW OF SYSTEMS  Included in HPI  All systems reviewed and negative except for those discussed in HPI.      PHYSICAL EXAM    I have reviewed the triage vital signs and nursing notes.    ED Triage Vitals   Temp Heart Rate Resp BP SpO2   07/23/25 1904 07/23/25 1904 07/23/25 1904 07/23/25 1908 07/23/25 1904   98.1 °F (36.7 °C) 84 18 138/83 99 %      Temp src Heart Rate Source Patient Position BP Location FiO2 (%)   -- -- -- -- --              Physical Exam  Constitutional:       General: He is not in acute distress.     Appearance: Normal appearance.   HENT:      Head: Normocephalic and atraumatic.      Nose: Nose normal.      Mouth/Throat:      Mouth: Mucous membranes are moist.   Eyes:      Conjunctiva/sclera: Conjunctivae normal.      Pupils: Pupils are equal, round, and reactive to light.   Cardiovascular:      Rate and Rhythm: Normal rate and regular rhythm.      Pulses: Normal pulses.      Heart sounds: Normal heart sounds.   Pulmonary:      Effort: Pulmonary effort is normal.      Breath sounds: Normal breath sounds.   Abdominal:      General: There is no distension.   Musculoskeletal:         General: Normal range of motion.      Cervical back: Normal range of motion and neck  supple.   Skin:     General: Skin is warm.      Capillary Refill: Capillary refill takes less than 2 seconds.      Comments: Approximately 2 cm laceration on the dorsum of the left hand over the fourth MCP joint.  Unable to fully extend the left fourth digit.   Neurological:      General: No focal deficit present.      Mental Status: He is alert and oriented to person, place, and time.   Psychiatric:         Mood and Affect: Mood normal.           RADIOLOGY  XR Hand 3+ View Left  Result Date: 7/23/2025  3 VIEWS LEFT HAND  HISTORY: Laceration  COMPARISON: None available.  FINDINGS: Images are degraded by patient positioning. No definite acute fracture or subluxation of the right hand is seen. No radiopaque retained foreign body is seen. There is minimal degenerative change.      No acute osseous findings.  This report was finalized on 7/23/2025 9:45 PM by Dr. Татьяна Kam M.D on Workstation: [id number]          MEDICATIONS GIVEN IN ER  Medications   lidocaine 1% - EPINEPHrine 1:[id number] (XYLOCAINE W/EPI) 1 %-1:405761 injection 10 mL (10 mL Injection Given by Other 7/23/25 2138)   acetaminophen (TYLENOL) tablet 650 mg (650 mg Oral Given 7/23/25 2108)   HYDROcodone-acetaminophen (NORCO) 5-325 MG per tablet 1 tablet (1 tablet Oral Given 7/23/25 2109)   cephalexin (KEFLEX) capsule 500 mg (500 mg Oral Given 7/23/25 2234)         ORDERS PLACED DURING THIS VISIT:  Orders Placed This Encounter   Procedures    Laceration Repair    XR Hand 3+ View Left    Wound Care         OUTPATIENT MEDICATION MANAGEMENT:  No current Epic-ordered facility-administered medications on file.     Current Outpatient Medications Ordered in Epic   Medication Sig Dispense Refill    ascorbic acid (VITAMIN C) 1000 MG tablet Take 1,000 mg by mouth Daily.      aspirin 81 MG chewable tablet Chew 81 mg Daily.      atorvastatin (LIPITOR) 20 MG tablet Take 20 mg by mouth Daily.      cephalexin (KEFLEX) 500 MG capsule Take 1 capsule by mouth 2 (Two)  Times a Day for 7 days. 14 capsule 0    docusate sodium (COLACE) 100 MG capsule Take 1 capsule by mouth 2 (Two) Times a Day. 60 capsule 0    glimepiride (AMARYL) 4 MG tablet Take 4 mg by mouth Every Morning Before Breakfast.      HYDROcodone-acetaminophen (NORCO) 5-325 MG per tablet Take 1 tablet by mouth Every 6 (Six) Hours As Needed (Pain). 30 tablet 0    HYDROcodone-acetaminophen (NORCO) 7.5-325 MG per tablet Take 1 tablet by mouth Every 4 (Four) Hours As Needed for Moderate Pain . 50 tablet 0    Lifitegrast (Xiidra) 5 % ophthalmic solution Apply 1 drop to eye(s) as directed by provider Every 12 (Twelve) Hours.      lisinopril (PRINIVIL,ZESTRIL) 10 MG tablet Take 10 mg by mouth Daily.      metFORMIN (GLUCOPHAGE) 500 MG tablet Take 500 mg by mouth 2 (Two) Times a Day With Meals.      Omega-3 Fatty Acids (FISH OIL ULTRA) 1400 MG capsule Take 1,400 mg by mouth Daily.      ondansetron (Zofran) 4 MG tablet Take 1 tablet by mouth Every 8 (Eight) Hours As Needed for Nausea or Vomiting. 30 tablet 0    rOPINIRole (REQUIP) 1 MG tablet Take 1 mg by mouth Every Night. Take 1 hour before bedtime.      vitamin B-12 (CYANOCOBALAMIN) 1000 MCG tablet Take 1,000 mcg by mouth Daily.           PROCEDURES  Laceration Repair    Date/Time: 7/23/2025 8:40 PM    Performed by: Kasie Rose PA-C  Authorized by: Jamaal Goodwin MD    Consent:     Consent obtained:  Verbal    Consent given by:  Patient    Risks discussed:  Infection, pain, poor cosmetic result and need for additional repair    Alternatives discussed:  No treatment  Universal protocol:     Procedure explained and questions answered to patient or proxy's satisfaction: yes      Patient identity confirmed:  Verbally with patient  Anesthesia:     Anesthesia method:  Local infiltration    Local anesthetic:  Lidocaine 1% WITH epi  Laceration details:     Location:  Finger    Finger location:  L ring finger    Length (cm):  2.5  Pre-procedure details:     Preparation:   Patient was prepped and draped in usual sterile fashion and imaging obtained to evaluate for foreign bodies  Exploration:     Limited defect created (wound extended): no      Hemostasis achieved with:  Epinephrine and direct pressure    Imaging obtained: x-ray      Imaging outcome: foreign body not noted      Wound exploration: wound explored through full range of motion and entire depth of wound visualized      Wound extent: tendon damage      Wound extent: no foreign bodies/material noted      Tendon damage location:  Upper extremity    Upper extremity tendon damage location:  Finger extensor    Tendon damage extent:  Complete transection    Tendon repair plan:  Refer for evaluation    Contaminated: no    Treatment:     Area cleansed with:  Saline    Amount of cleaning:  Extensive    Irrigation solution:  Sterile saline    Irrigation volume:  100 ml    Irrigation method:  Syringe    Visualized foreign bodies/material removed: no      Debridement:  None    Undermining:  None    Scar revision: no    Skin repair:     Repair method:  Sutures    Suture size:  5-0    Suture material:  Nylon    Suture technique:  Simple interrupted    Number of sutures:  5  Approximation:     Approximation:  Close  Repair type:     Repair type:  Simple  Post-procedure details:     Dressing:  Splint for protection and adhesive bandage    Procedure completion:  Tolerated well, no immediate complications          PROGRESS, DATA ANALYSIS, CONSULTS, AND MEDICAL DECISION MAKING  All labs have been independently interpreted by me.  All radiology studies have been reviewed by me. All EKG's have been independently viewed and interpreted by me.  Discussion below represents my analysis of pertinent findings related to patient's condition, differential diagnosis, treatment plan and final disposition.    Differential diagnosis includes but is not limited to tendon laceration, skin laceration, vascular injury.        ED Course as of 07/24/25 0028    Wed Jul 23, 2025 2151 Consult placed to discuss with Kleinert and Kunz [MP]   2223 Spoke with on-call provider for hand surgery.  They recommend that patient call the office after 8 AM tomorrow to schedule close follow-up appointment.  Keep finger in splint. [MP]      ED Course User Index  [MP] Kasie Rose PA-C             AS OF 00:28 EDT VITALS:    BP - 143/82  HR - 64  TEMP - 98.1 °F (36.7 °C)  O2 SATS - 98%    COMPLEXITY OF CARE  Admission was considered but after careful review of the patient's presentation, physical examination, diagnostic results, and response to treatment the patient may be safely discharged with outpatient follow-up.      DIAGNOSIS  Final diagnoses:   Laceration of finger, left, with tendon, initial encounter         DISPOSITION  ED Disposition       ED Disposition   Discharge    Condition   Stable    Comment   --                Please note that portions of this document were completed with a voice recognition program.    Note Disclaimer: At [facility], we believe that sharing information builds trust and better relationships. You are receiving this note because you recently visited [facility]. It is possible you will see health information before a provider has talked with you about it. This kind of information can be easy to misunderstand. To help you fully understand what it means for your health, we urge you to discuss this note with your provider.     Kasie Rose PA-C  07/24/25 0029

## 2025-07-24 NOTE — ED PROVIDER NOTES
EMERGENCY DEPARTMENT ENCOUNTER  Room Number:  S02/02  PCP: Marcos Silva MD  Independent Historians: Historian: Patient      HPI:  Chief Complaint: had concerns including Dressing Change and Pain.     A complete HPI/ROS/PMH/PSH/SH/FH are unobtainable due to: EM_Unobtainable History : None    Chronic or social conditions impacting patient care (Social Determinants of Health): None      Context: The patient is a 65 y.o. male with a medical history of hypertension, hyperlipidemia, diabetes, CKD, CAD who presents to the ED c/o acute left ring finger pain.  He states that he suffered a laceration to it last night via glass while doing the dishes, was in the ER and had stitches placed.  A splint and bandage were applied and he he has follow-up with hand surgery tomorrow.  He has persistent pain in the area and presents for reevaluation.  Denies any additional trauma, no fever or chills, has been taking ibuprofen 600 every 6 hours without adequate relief.      Review of prior external notes (non-ED) -and- Review of prior external test results outside of this encounter: Labs performed 4/12/2024 and TSH, hemoglobin and white blood cell count were all normal        PAST MEDICAL HISTORY  Active Ambulatory Problems     Diagnosis Date Noted    JAI on autoCPAP 06/18/2017    Cervical spinal stenosis 09/06/2020    Left sided numbness 09/06/2020    Hypertension 09/06/2020    Hyperlipidemia 09/06/2020    Diabetes mellitus 09/06/2020    Coronary artery disease 09/06/2020    CKD (chronic kidney disease) 09/06/2020    KENZIE (acute kidney injury) 09/06/2020    Headache, migraine 09/07/2020    Elbow injury, right, initial encounter 08/10/2022    Right elbow pain 08/10/2022     Resolved Ambulatory Problems     Diagnosis Date Noted    Suspected COVID-19 virus infection 09/06/2020    TIA (transient ischemic attack) 09/06/2020     No Additional Past Medical History         PAST SURGICAL HISTORY  Past Surgical History:   Procedure  Laterality Date    CORONARY ANGIOPLASTY WITH STENT PLACEMENT      ELBOW CLOSED REDUCTION Right 8/11/2022    Procedure: ELBOW CLOSED REDUCTION;  Surgeon: David Winston MD;  Location: [facility] OR [facility] [place];  Service: Orthopedics;  Laterality: Right;         FAMILY HISTORY  History reviewed. No pertinent family history.      SOCIAL HISTORY  Social History     Socioeconomic History    Marital status:    Tobacco Use    Smoking status: Never    Smokeless tobacco: Never   Vaping Use    Vaping status: Never Used   Substance and Sexual Activity    Alcohol use: No    Drug use: No    Sexual activity: Defer         ALLERGIES  Ampicillin      REVIEW OF SYSTEMS  Review of Systems  Included in HPI  All systems reviewed and negative except for those discussed in HPI.      PHYSICAL EXAM    I have reviewed the triage vital signs and nursing notes.    ED Triage Vitals   Temp Heart Rate Resp BP SpO2   07/24/25 1127 07/24/25 1127 07/24/25 1127 07/24/25 1131 07/24/25 1127   97.4 °F (36.3 °C) 70 18 133/77 97 %      Temp src Heart Rate Source Patient Position BP Location FiO2 (%)   07/24/25 1127 -- -- -- --   Oral           Physical Exam  GENERAL: alert, no acute distress  SKIN: Warm, dry  HENT: Normocephalic, atraumatic  EYES: no scleral icterus  CV: regular rhythm, regular rate  RESPIRATORY: normal effort, lungs clear  ABDOMEN: nondistended  MUSCULOSKELETAL: Sutured laceration over the dorsal aspect of the proximal left ring finger.  Unable to fully extend.  There is no erythema edema or dehiscence, no active bleeding.  Sensation intact distally, cap refill brisk.  NEURO: alert, moves all extremities, follows commands                MEDICATIONS GIVEN IN ER  Medications   acetaminophen (TYLENOL) tablet 1,000 mg (1,000 mg Oral Given 7/24/25 1219)         ORDERS PLACED DURING THIS VISIT:  No orders of the defined types were placed in this encounter.        OUTPATIENT MEDICATION MANAGEMENT:  No current Epic-ordered facility-administered  medications on file.     Current Outpatient Medications Ordered in Epic   Medication Sig Dispense Refill    ascorbic acid (VITAMIN C) 1000 MG tablet Take 1,000 mg by mouth Daily.      aspirin 81 MG chewable tablet Chew 81 mg Daily.      atorvastatin (LIPITOR) 20 MG tablet Take 20 mg by mouth Daily.      cephalexin (KEFLEX) 500 MG capsule Take 1 capsule by mouth 2 (Two) Times a Day for 7 days. 14 capsule 0    docusate sodium (COLACE) 100 MG capsule Take 1 capsule by mouth 2 (Two) Times a Day. 60 capsule 0    glimepiride (AMARYL) 4 MG tablet Take 4 mg by mouth Every Morning Before Breakfast.      HYDROcodone-acetaminophen (NORCO) 5-325 MG per tablet Take 1 tablet by mouth Every 6 (Six) Hours As Needed for Severe Pain for up to 3 days. 12 tablet 0    Lifitegrast (Xiidra) 5 % ophthalmic solution Apply 1 drop to eye(s) as directed by provider Every 12 (Twelve) Hours.      lisinopril (PRINIVIL,ZESTRIL) 10 MG tablet Take 10 mg by mouth Daily.      metFORMIN (GLUCOPHAGE) 500 MG tablet Take 500 mg by mouth 2 (Two) Times a Day With Meals.      naloxone (NARCAN) 4 MG/0.1ML nasal spray Call 911. Don't prime. [place] in 1 nostril for overdose. Repeat in 2-3 minutes in other nostril if no or minimal breathing/responsiveness. 2 each 0    Omega-3 Fatty Acids (FISH OIL ULTRA) 1400 MG capsule Take 1,400 mg by mouth Daily.      ondansetron (Zofran) 4 MG tablet Take 1 tablet by mouth Every 8 (Eight) Hours As Needed for Nausea or Vomiting. 30 tablet 0    rOPINIRole (REQUIP) 1 MG tablet Take 1 mg by mouth Every Night. Take 1 hour before bedtime.      vitamin B-12 (CYANOCOBALAMIN) 1000 MCG tablet Take 1,000 mcg by mouth Daily.           PROCEDURES  Procedures            PROGRESS, DATA ANALYSIS, CONSULTS, AND MEDICAL DECISION MAKING  All labs have been independently interpreted by me.  All radiology studies have been reviewed by me. All EKG's have been independently viewed and interpreted by me.  Discussion below represents my analysis of  pertinent findings related to patient's condition, differential diagnosis, treatment plan and final disposition.    DIFFERENTIAL    My differential diagnosis includes but is not limited to posttraumatic pain, cellulitis, dehiscence    Clinical Scores:                  ED Course as of 07/24/25 1237   Thu Jul 24, 2025   1236 Patient's wound appears unremarkable, no dehiscence, erythema edema drainage or signs of infection.  Bandage and a aluminum splint reapplied.  I given him a dose of Tylenol here in the emergency department, will discharge with a short course of hydrocodone for pain control.  Counseled on narcotic use, return precautions discussed, encouraged keeping his follow-up tomorrow with hand surgery. [KA]      ED Course User Index  [KA] Natalia Goodson PA-C             BP - 133/77  HR - 70  TEMP - 97.4 °F (36.3 °C) (Oral)  O2 SATS - 97%    COMPLEXITY OF CARE  Admission was considered but after careful review of the patient's presentation, physical examination, diagnostic results, and response to treatment the patient may be safely discharged with outpatient follow-up.      DIAGNOSIS  Final diagnoses:   Finger pain, left         DISPOSITION  ED Disposition       ED Disposition   Discharge    Condition   Stable    Comment   --                  FOLLOW UP  kleinert Kutz      tomorrow as scheduled        Prescribed Medications     Medication List        New Prescriptions      naloxone 4 MG/0.1ML nasal spray  Commonly known as: NARCAN  Call 911. Don't prime. [place] in 1 nostril for overdose. Repeat in 2-3 minutes in other nostril if no or minimal breathing/responsiveness.            Changed      HYDROcodone-acetaminophen 5-325 MG per tablet  Commonly known as: NORCO  Take 1 tablet by mouth Every 6 (Six) Hours As Needed for Severe Pain for up to 3 days.  What changed:   reasons to take this  Another medication with the same name was removed. Continue taking this medication, and follow the directions you see  here.               Where to Get Your Medications        These medications were sent to [id number] - [place], [place] - [address] - [phone]  - [phone] FX  [address], [facility] [address]      Phone: [phone]   HYDROcodone-acetaminophen 5-325 MG per tablet  naloxone 4 MG/0.1ML nasal spray                   Please note that portions of this document were completed with a voice recognition program.    Note Disclaimer: At [facility], we believe that sharing information builds trust and better relationships. You are receiving this note because you recently visited [facility]. It is possible you will see health information before a provider has talked with you about it. This kind of information can be easy to misunderstand. To help you fully understand what it means for your health, we urge you to discuss this note with your provider.         Natalia Goodson PA-C  07/24/25 5940

## 2025-07-24 NOTE — ED PROVIDER NOTES
MD ATTESTATION NOTE    SHARED VISIT: This visit was performed by BOTH a physician and an APC. The substantive portion of the medical decision making was performed by this attesting physician who made or approved the management plan and takes responsibility for patient management. All studies documented in the APC note (if performed) were independently interpreted by me.    The VEN[facility] and I have discussed this patient's history, physical exam, MDM, and treatment plan.  I have reviewed the documentation and personally had a face to face interaction with the patient. The attached note describes my personal findings.      Benny Rodríguez is a 65 y.o. male who presents to the ED c/o acute pain to his left fourth digit.  He had a laceration last night with extensor tendon injury.  Has a follow-up with hand surgery tomorrow.  However, he is back here today due to pain.  No interval trauma.  No fever.  Left-hand-dominant.    On exam:  GENERAL: not distressed  HENT: nares patent  EYES: no scleral icterus  CV: regular rhythm, regular rate  RESPIRATORY: normal effort  ABDOMEN: soft  MUSCULOSKELETAL: no deformity  NEURO: alert, moves all extremities, follows commands  SKIN: warm, dry, left fourth digit is wrapped.  Dressing is clean and dry.    Labs  No results found for this or any previous visit (from the past 24 hours).    Radiology  XR Hand 3+ View Left  Result Date: 7/23/2025  3 VIEWS LEFT HAND  HISTORY: Laceration  COMPARISON: None available.  FINDINGS: Images are degraded by patient positioning. No definite acute fracture or subluxation of the right hand is seen. No radiopaque retained foreign body is seen. There is minimal degenerative change.      No acute osseous findings.  This report was finalized on 7/23/2025 9:45 PM by Dr. Татьяна Kam M.D on Workstation: [id number]        Medications given in the ED:  Medications   acetaminophen (TYLENOL) tablet 1,000 mg (1,000 mg Oral Given 7/24/25 1219)       Orders placed  during this visit:  No orders of the defined types were placed in this encounter.      Medical Decision Making:  ED Course as of 07/25/25 1436   Thu Jul 24, 2025   1236 Patient's wound appears unremarkable, no dehiscence, erythema edema drainage or signs of infection.  Bandage and a aluminum splint reapplied.  I given him a dose of Tylenol here in the emergency department, will discharge with a short course of hydrocodone for pain control.  Counseled on narcotic use, return precautions discussed, encouraged keeping his follow-up tomorrow with hand surgery. [KA]      ED Course User Index  [KA] Natalia Goodson PA-C       Clinical Scores:                                     Diagnosis  Final diagnoses:   Finger pain, right          Marcos Calderon II, MD  07/25/25 1430

## 2025-07-24 NOTE — DISCHARGE INSTRUCTIONS
Wear splint and bandage until hand surgery follow up    Narcotic pain medications can make you loopy, sleepy, constipated.  Take a stool softener of your choice and drink plenty of fluids.  Do not operate heavy machinery, drive, or make important decisions will taking this medication.  There is a risk of addiction.  Take it only as prescribed.

## 2025-07-24 NOTE — ED PROVIDER NOTES
MD ATTESTATION NOTE  I supervised care provided by the midlevel provider. We have discussed this patient's history, physical exam, and treatment plan. I have reviewed the midlevel provider's note and I agree with the midlevel provider's findings and plan of care.   SHARED VISIT: This visit was performed by BOTH a physician and an APC. The substantive portion of the medical decision making was performed by this attesting physician who made or approved the management plan and takes responsibility for patient management. All studies in the APC note (if performed) were independently interpreted by me.   I have personally had a face to face encounter with the patient.     PCP: Marcos Silva MD  Patient Care Team:  Marcos Silva MD as PCP - General (Emergency Medicine)  Minh Torres MD as Consulting Physician (Sleep Medicine)     Benny Rodríguez is a 65 y.o. male who presents to the ED c/o laceration of left hand.  Patient reports that he accidentally cut his hand while washing dishes on a broken glass.  No weakness or numbness, not on blood thinner, tetanus up-to-date.  Patient reports he was at baseline health prior to injury.    On exam:  General: NAD.  Head: NCAT.  ENT: nares patent, no scleral icterus  Neck: Supple, trachea midline.  Cardiac: regular rate and rhythm.  Lungs: normal effort, clear to auscultation bilaterally  Abdomen: Soft, nondistended, NTTP, no rebound tenderness, no guarding or rigidity.   Extremities: Moves all extremities well, no peripheral edema.  Left hand: Small linear laceration on the dorsum of the hand at the fourth MCP, and otherwise normal in appearance.  Unable to extend the finger, patient also reports unable to flex the finger, sensation intact light touch, brisk refill.  Neuro: alert, MAEW, follows commands  Psych: calm, cooperative  Skin: Warm, dry.    Medical Decision Making:  After the initial H&P, I discussed pertinent information from history and  physical exam with patient/family.  Discussed differential diagnosis.  Discussed plan for ED evaluation/work-up/treatment.  All questions answered.  Patient/family is agreeable with plan.    ED Course as of 07/24/25 1237   Wed Jul 23, 2025 2151 Consult placed to discuss with [facility] [MP]   2223 Spoke with on-call provider for hand surgery.  They recommend that patient call the office after 8 AM tomorrow to schedule close follow-up appointment.  Keep finger in splint. [MP]      ED Course User Index  [MP] Kasie Rose, PACarC       Diagnosis  Final diagnoses:   Laceration of finger, left, with tendon, initial encounter          Jamaal Goodwin MD  07/24/25 1234

## (undated) DEVICE — TRAP FLD MINIVAC MEGADYNE 100ML

## (undated) DEVICE — GLV SURG SIGNATURE ESSENTIAL PF LTX SZ8.5

## (undated) DEVICE — TBG PENCL TELESCP MEGADYNE SMOKE EVAC 10FT

## (undated) DEVICE — GLV SURG BIOGEL LTX PF 8 1/2